# Patient Record
Sex: FEMALE | Race: WHITE | NOT HISPANIC OR LATINO
[De-identification: names, ages, dates, MRNs, and addresses within clinical notes are randomized per-mention and may not be internally consistent; named-entity substitution may affect disease eponyms.]

---

## 2019-01-29 ENCOUNTER — FORM ENCOUNTER (OUTPATIENT)
Age: 59
End: 2019-01-29

## 2019-05-12 ENCOUNTER — FORM ENCOUNTER (OUTPATIENT)
Age: 59
End: 2019-05-12

## 2019-07-02 ENCOUNTER — APPOINTMENT (OUTPATIENT)
Dept: CARDIOLOGY | Facility: CLINIC | Age: 59
End: 2019-07-02
Payer: MEDICARE

## 2019-07-02 PROCEDURE — 93000 ELECTROCARDIOGRAM COMPLETE: CPT

## 2019-07-02 PROCEDURE — 99204 OFFICE O/P NEW MOD 45 MIN: CPT

## 2019-07-28 ENCOUNTER — FORM ENCOUNTER (OUTPATIENT)
Age: 59
End: 2019-07-28

## 2019-07-31 ENCOUNTER — APPOINTMENT (OUTPATIENT)
Dept: CARDIOLOGY | Facility: CLINIC | Age: 59
End: 2019-07-31

## 2019-08-01 ENCOUNTER — APPOINTMENT (OUTPATIENT)
Dept: CARDIOLOGY | Facility: CLINIC | Age: 59
End: 2019-08-01
Payer: MEDICARE

## 2019-08-01 PROCEDURE — 93015 CV STRESS TEST SUPVJ I&R: CPT

## 2019-09-23 ENCOUNTER — APPOINTMENT (OUTPATIENT)
Dept: CARDIOLOGY | Facility: CLINIC | Age: 59
End: 2019-09-23
Payer: MEDICARE

## 2019-09-23 PROCEDURE — 93306 TTE W/DOPPLER COMPLETE: CPT

## 2019-10-22 ENCOUNTER — OUTPATIENT (OUTPATIENT)
Dept: OUTPATIENT SERVICES | Facility: HOSPITAL | Age: 59
LOS: 1 days | Discharge: HOME | End: 2019-10-22

## 2019-10-22 DIAGNOSIS — I42.9 CARDIOMYOPATHY, UNSPECIFIED: ICD-10-CM

## 2019-10-22 DIAGNOSIS — E05.00 THYROTOXICOSIS WITH DIFFUSE GOITER WITHOUT THYROTOXIC CRISIS OR STORM: ICD-10-CM

## 2019-10-22 DIAGNOSIS — R53.83 OTHER FATIGUE: ICD-10-CM

## 2019-10-22 DIAGNOSIS — E78.5 HYPERLIPIDEMIA, UNSPECIFIED: ICD-10-CM

## 2019-10-22 DIAGNOSIS — R94.6 ABNORMAL RESULTS OF THYROID FUNCTION STUDIES: ICD-10-CM

## 2019-10-22 DIAGNOSIS — E55.9 VITAMIN D DEFICIENCY, UNSPECIFIED: ICD-10-CM

## 2020-01-21 ENCOUNTER — FORM ENCOUNTER (OUTPATIENT)
Age: 60
End: 2020-01-21

## 2020-06-14 ENCOUNTER — FORM ENCOUNTER (OUTPATIENT)
Age: 60
End: 2020-06-14

## 2020-06-15 ENCOUNTER — EMERGENCY (EMERGENCY)
Facility: HOSPITAL | Age: 60
LOS: 0 days | Discharge: HOME | End: 2020-06-15
Attending: EMERGENCY MEDICINE | Admitting: EMERGENCY MEDICINE
Payer: MEDICARE

## 2020-06-15 VITALS
TEMPERATURE: 99 F | DIASTOLIC BLOOD PRESSURE: 66 MMHG | RESPIRATION RATE: 18 BRPM | SYSTOLIC BLOOD PRESSURE: 122 MMHG | OXYGEN SATURATION: 100 % | HEART RATE: 80 BPM

## 2020-06-15 VITALS
TEMPERATURE: 99 F | HEART RATE: 88 BPM | SYSTOLIC BLOOD PRESSURE: 151 MMHG | RESPIRATION RATE: 18 BRPM | DIASTOLIC BLOOD PRESSURE: 86 MMHG | OXYGEN SATURATION: 97 % | WEIGHT: 199.08 LBS | HEIGHT: 65 IN

## 2020-06-15 DIAGNOSIS — I10 ESSENTIAL (PRIMARY) HYPERTENSION: ICD-10-CM

## 2020-06-15 DIAGNOSIS — Z79.899 OTHER LONG TERM (CURRENT) DRUG THERAPY: ICD-10-CM

## 2020-06-15 DIAGNOSIS — R07.89 OTHER CHEST PAIN: ICD-10-CM

## 2020-06-15 DIAGNOSIS — E11.9 TYPE 2 DIABETES MELLITUS WITHOUT COMPLICATIONS: ICD-10-CM

## 2020-06-15 DIAGNOSIS — Z79.84 LONG TERM (CURRENT) USE OF ORAL HYPOGLYCEMIC DRUGS: ICD-10-CM

## 2020-06-15 LAB
ALBUMIN SERPL ELPH-MCNC: 4.6 G/DL — SIGNIFICANT CHANGE UP (ref 3.5–5.2)
ALP SERPL-CCNC: 76 U/L — SIGNIFICANT CHANGE UP (ref 30–115)
ALT FLD-CCNC: 27 U/L — SIGNIFICANT CHANGE UP (ref 0–41)
ANION GAP SERPL CALC-SCNC: 12 MMOL/L — SIGNIFICANT CHANGE UP (ref 7–14)
APTT BLD: 31.3 SEC — SIGNIFICANT CHANGE UP (ref 27–39.2)
AST SERPL-CCNC: 17 U/L — SIGNIFICANT CHANGE UP (ref 0–41)
BASOPHILS # BLD AUTO: 0.08 K/UL — SIGNIFICANT CHANGE UP (ref 0–0.2)
BASOPHILS NFR BLD AUTO: 0.9 % — SIGNIFICANT CHANGE UP (ref 0–1)
BILIRUB SERPL-MCNC: 0.4 MG/DL — SIGNIFICANT CHANGE UP (ref 0.2–1.2)
BUN SERPL-MCNC: 14 MG/DL — SIGNIFICANT CHANGE UP (ref 10–20)
CALCIUM SERPL-MCNC: 9.7 MG/DL — SIGNIFICANT CHANGE UP (ref 8.5–10.1)
CHLORIDE SERPL-SCNC: 103 MMOL/L — SIGNIFICANT CHANGE UP (ref 98–110)
CO2 SERPL-SCNC: 24 MMOL/L — SIGNIFICANT CHANGE UP (ref 17–32)
CREAT SERPL-MCNC: 0.8 MG/DL — SIGNIFICANT CHANGE UP (ref 0.7–1.5)
EOSINOPHIL # BLD AUTO: 0.32 K/UL — SIGNIFICANT CHANGE UP (ref 0–0.7)
EOSINOPHIL NFR BLD AUTO: 3.7 % — SIGNIFICANT CHANGE UP (ref 0–8)
GLUCOSE SERPL-MCNC: 149 MG/DL — HIGH (ref 70–99)
HCT VFR BLD CALC: 37.8 % — SIGNIFICANT CHANGE UP (ref 37–47)
HGB BLD-MCNC: 12.7 G/DL — SIGNIFICANT CHANGE UP (ref 12–16)
IMM GRANULOCYTES NFR BLD AUTO: 0.5 % — HIGH (ref 0.1–0.3)
INR BLD: 0.96 RATIO — SIGNIFICANT CHANGE UP (ref 0.65–1.3)
LYMPHOCYTES # BLD AUTO: 2.77 K/UL — SIGNIFICANT CHANGE UP (ref 1.2–3.4)
LYMPHOCYTES # BLD AUTO: 32.1 % — SIGNIFICANT CHANGE UP (ref 20.5–51.1)
MCHC RBC-ENTMCNC: 30.8 PG — SIGNIFICANT CHANGE UP (ref 27–31)
MCHC RBC-ENTMCNC: 33.6 G/DL — SIGNIFICANT CHANGE UP (ref 32–37)
MCV RBC AUTO: 91.5 FL — SIGNIFICANT CHANGE UP (ref 81–99)
MONOCYTES # BLD AUTO: 0.77 K/UL — HIGH (ref 0.1–0.6)
MONOCYTES NFR BLD AUTO: 8.9 % — SIGNIFICANT CHANGE UP (ref 1.7–9.3)
NEUTROPHILS # BLD AUTO: 4.65 K/UL — SIGNIFICANT CHANGE UP (ref 1.4–6.5)
NEUTROPHILS NFR BLD AUTO: 53.9 % — SIGNIFICANT CHANGE UP (ref 42.2–75.2)
NRBC # BLD: 0 /100 WBCS — SIGNIFICANT CHANGE UP (ref 0–0)
PLATELET # BLD AUTO: 326 K/UL — SIGNIFICANT CHANGE UP (ref 130–400)
POTASSIUM SERPL-MCNC: 3.9 MMOL/L — SIGNIFICANT CHANGE UP (ref 3.5–5)
POTASSIUM SERPL-SCNC: 3.9 MMOL/L — SIGNIFICANT CHANGE UP (ref 3.5–5)
PROT SERPL-MCNC: 7.4 G/DL — SIGNIFICANT CHANGE UP (ref 6–8)
PROTHROM AB SERPL-ACNC: 11 SEC — SIGNIFICANT CHANGE UP (ref 9.95–12.87)
RBC # BLD: 4.13 M/UL — LOW (ref 4.2–5.4)
RBC # FLD: 12.1 % — SIGNIFICANT CHANGE UP (ref 11.5–14.5)
SODIUM SERPL-SCNC: 139 MMOL/L — SIGNIFICANT CHANGE UP (ref 135–146)
TROPONIN T SERPL-MCNC: <0.01 NG/ML — SIGNIFICANT CHANGE UP
WBC # BLD: 8.63 K/UL — SIGNIFICANT CHANGE UP (ref 4.8–10.8)
WBC # FLD AUTO: 8.63 K/UL — SIGNIFICANT CHANGE UP (ref 4.8–10.8)

## 2020-06-15 PROCEDURE — 99285 EMERGENCY DEPT VISIT HI MDM: CPT

## 2020-06-15 PROCEDURE — 71045 X-RAY EXAM CHEST 1 VIEW: CPT | Mod: 26

## 2020-06-15 NOTE — ED PROVIDER NOTE - CARE PROVIDER_API CALL
Quentin Moore  Interventional Cardiology  [address] [place] [id number]  Phone: [phone]  Fax: [phone]  Follow Up Time:

## 2020-06-15 NOTE — ED PROVIDER NOTE - ATTENDING CONTRIBUTION TO CARE
59 y.o. female comes in for evaluation. Pt states has been having some chest discomfort over last 6 days, today went for a routine GYN appointment and was found to have high BP. Pt denies any symptoms now. No HA, CP/SOB, abdominal pain, n/v/c/d. No vision changes. On exam, pt in NAD, AAOx3, head NC/AT, CN II-XII intact, lungs CTA B/L, CV S1S2 regular, abdomen soft/NT/ND/(+)BS, ext (-) edema, motor 5/5x4, sensation intact. Will do labs, EKG, CXR and reevaluate.

## 2020-06-15 NOTE — ED ADULT TRIAGE NOTE - CHIEF COMPLAINT QUOTE
pt went to her GYN today and had elevated bp 180/100, pt also states that yesterday she woke up feeling dizzy and had chest tightness

## 2020-06-15 NOTE — ED PROVIDER NOTE - PATIENT PORTAL LINK FT
You can access the FollowMyHealth Patient Portal offered by [facility] by registering at the following website: [email or web address]. By joining [employer]’s FollowMyHealth portal, you will also be able to view your health information using other applications (apps) compatible with our system.

## 2020-06-15 NOTE — ED PROVIDER NOTE - NS ED ROS FT
Review of Systems:  	•	CONSTITUTIONAL - no fever, no diaphoresis, no chills  	•	SKIN - no rash  	•	HEMATOLOGIC - no bleeding, no bruising  	•	EYES - no eye pain, no blurry vision  	•	ENT - no change in hearing, no sore throat, no ear pain or tinnitus  	•	RESPIRATORY - no shortness of breath, no cough  	•	CARDIAC -  chest pain, no palpitations  	•	GI - no abd pain, no nausea, no vomiting, no diarrhea, no constipation  	•	GENITO-URINARY - no discharge, no dysuria; no hematuria, no increased urinary frequency  	•	MUSCULOSKELETAL - no joint paint, no swelling, no redness  	•	NEUROLOGIC - no weakness, no headache, no paresthesias, no LOC  	•	PSYCH - no anxiety, non suicidal, non homicidal, no hallucination, no depression Review of Systems:  	•	CONSTITUTIONAL - no fever, no diaphoresis, no chills  	•	SKIN - no rash  	•	HEMATOLOGIC - no bleeding, no bruising  	•	EYES - no eye pain, no blurry vision  	•	ENT - no change in hearing, no sore throat, no ear pain or tinnitus  	•	RESPIRATORY - no shortness of breath, no cough  	•	CARDIAC -  chest pain intermittent none today , no palpitations  	•	GI - no abd pain, no nausea, no vomiting, no diarrhea, no constipation  	•	GENITO-URINARY - no discharge, no dysuria; no hematuria, no increased urinary frequency  	•	MUSCULOSKELETAL - no joint paint, no swelling, no redness  	•	NEUROLOGIC - no weakness, no headache, no paresthesias, no LOC  	•	PSYCH - no anxiety, non suicidal, non homicidal, no hallucination, no depression

## 2020-06-15 NOTE — ED ADULT NURSE NOTE - OBJECTIVE STATEMENT
hypertensive at doctor's office today, no history, felt chest pressure for the last few days, aaox3, skin warm and dry, steady gait to restroom

## 2020-06-17 ENCOUNTER — APPOINTMENT (OUTPATIENT)
Dept: CARDIOLOGY | Facility: CLINIC | Age: 60
End: 2020-06-17
Payer: MEDICARE

## 2020-06-17 PROBLEM — E11.9 TYPE 2 DIABETES MELLITUS WITHOUT COMPLICATIONS: Chronic | Status: ACTIVE | Noted: 2020-06-15

## 2020-06-17 PROBLEM — I10 ESSENTIAL (PRIMARY) HYPERTENSION: Chronic | Status: ACTIVE | Noted: 2020-06-15

## 2020-06-17 PROCEDURE — 93000 ELECTROCARDIOGRAM COMPLETE: CPT

## 2020-06-17 PROCEDURE — 99214 OFFICE O/P EST MOD 30 MIN: CPT

## 2020-06-18 ENCOUNTER — FORM ENCOUNTER (OUTPATIENT)
Age: 60
End: 2020-06-18

## 2020-06-21 ENCOUNTER — FORM ENCOUNTER (OUTPATIENT)
Age: 60
End: 2020-06-21

## 2020-06-22 ENCOUNTER — APPOINTMENT (OUTPATIENT)
Dept: CARDIOLOGY | Facility: CLINIC | Age: 60
End: 2020-06-22
Payer: MEDICARE

## 2020-06-22 PROCEDURE — 93015 CV STRESS TEST SUPVJ I&R: CPT

## 2020-07-06 ENCOUNTER — FORM ENCOUNTER (OUTPATIENT)
Age: 60
End: 2020-07-06

## 2020-07-13 ENCOUNTER — FORM ENCOUNTER (OUTPATIENT)
Age: 60
End: 2020-07-13

## 2020-07-20 ENCOUNTER — APPOINTMENT (OUTPATIENT)
Dept: CARDIOLOGY | Facility: CLINIC | Age: 60
End: 2020-07-20
Payer: MEDICARE

## 2020-07-20 PROCEDURE — 93000 ELECTROCARDIOGRAM COMPLETE: CPT

## 2020-07-20 PROCEDURE — 99214 OFFICE O/P EST MOD 30 MIN: CPT

## 2020-07-23 ENCOUNTER — FORM ENCOUNTER (OUTPATIENT)
Age: 60
End: 2020-07-23

## 2020-08-23 ENCOUNTER — FORM ENCOUNTER (OUTPATIENT)
Age: 60
End: 2020-08-23

## 2021-01-07 ENCOUNTER — INPATIENT (INPATIENT)
Facility: HOSPITAL | Age: 61
LOS: 1 days | Discharge: HOME HEALTH | End: 2021-01-09
Attending: INTERNAL MEDICINE | Admitting: INTERNAL MEDICINE
Payer: MEDICARE

## 2021-01-07 ENCOUNTER — TRANSCRIPTION ENCOUNTER (OUTPATIENT)
Age: 61
End: 2021-01-07

## 2021-01-07 VITALS
TEMPERATURE: 100 F | WEIGHT: 190.04 LBS | SYSTOLIC BLOOD PRESSURE: 150 MMHG | DIASTOLIC BLOOD PRESSURE: 92 MMHG | RESPIRATION RATE: 18 BRPM | OXYGEN SATURATION: 92 % | HEART RATE: 112 BPM | HEIGHT: 65 IN

## 2021-01-07 DIAGNOSIS — Z98.890 OTHER SPECIFIED POSTPROCEDURAL STATES: Chronic | ICD-10-CM

## 2021-01-07 LAB
ALBUMIN SERPL ELPH-MCNC: 3.7 G/DL — SIGNIFICANT CHANGE UP (ref 3.5–5.2)
ALP SERPL-CCNC: 61 U/L — SIGNIFICANT CHANGE UP (ref 30–115)
ALT FLD-CCNC: 45 U/L — HIGH (ref 0–41)
ANION GAP SERPL CALC-SCNC: 13 MMOL/L — SIGNIFICANT CHANGE UP (ref 7–14)
AST SERPL-CCNC: 38 U/L — SIGNIFICANT CHANGE UP (ref 0–41)
BASOPHILS # BLD AUTO: 0.01 K/UL — SIGNIFICANT CHANGE UP (ref 0–0.2)
BASOPHILS NFR BLD AUTO: 0.1 % — SIGNIFICANT CHANGE UP (ref 0–1)
BILIRUB SERPL-MCNC: 0.8 MG/DL — SIGNIFICANT CHANGE UP (ref 0.2–1.2)
BUN SERPL-MCNC: 11 MG/DL — SIGNIFICANT CHANGE UP (ref 10–20)
CALCIUM SERPL-MCNC: 8.6 MG/DL — SIGNIFICANT CHANGE UP (ref 8.5–10.1)
CHLORIDE SERPL-SCNC: 93 MMOL/L — LOW (ref 98–110)
CO2 SERPL-SCNC: 24 MMOL/L — SIGNIFICANT CHANGE UP (ref 17–32)
CREAT SERPL-MCNC: 0.7 MG/DL — SIGNIFICANT CHANGE UP (ref 0.7–1.5)
D DIMER BLD IA.RAPID-MCNC: 650 NG/ML DDU — HIGH (ref 0–230)
EOSINOPHIL # BLD AUTO: 0 K/UL — SIGNIFICANT CHANGE UP (ref 0–0.7)
EOSINOPHIL NFR BLD AUTO: 0 % — SIGNIFICANT CHANGE UP (ref 0–8)
GLUCOSE BLDC GLUCOMTR-MCNC: 160 MG/DL — HIGH (ref 70–99)
GLUCOSE SERPL-MCNC: 257 MG/DL — HIGH (ref 70–99)
HCT VFR BLD CALC: 33.4 % — LOW (ref 37–47)
HGB BLD-MCNC: 11.3 G/DL — LOW (ref 12–16)
IMM GRANULOCYTES NFR BLD AUTO: 0.8 % — HIGH (ref 0.1–0.3)
LYMPHOCYTES # BLD AUTO: 0.61 K/UL — LOW (ref 1.2–3.4)
LYMPHOCYTES # BLD AUTO: 7.7 % — LOW (ref 20.5–51.1)
MCHC RBC-ENTMCNC: 31.1 PG — HIGH (ref 27–31)
MCHC RBC-ENTMCNC: 33.8 G/DL — SIGNIFICANT CHANGE UP (ref 32–37)
MCV RBC AUTO: 92 FL — SIGNIFICANT CHANGE UP (ref 81–99)
MONOCYTES # BLD AUTO: 0.61 K/UL — HIGH (ref 0.1–0.6)
MONOCYTES NFR BLD AUTO: 7.7 % — SIGNIFICANT CHANGE UP (ref 1.7–9.3)
NEUTROPHILS # BLD AUTO: 6.67 K/UL — HIGH (ref 1.4–6.5)
NEUTROPHILS NFR BLD AUTO: 83.7 % — HIGH (ref 42.2–75.2)
NRBC # BLD: 0 /100 WBCS — SIGNIFICANT CHANGE UP (ref 0–0)
NT-PROBNP SERPL-SCNC: 174 PG/ML — SIGNIFICANT CHANGE UP (ref 0–300)
PLATELET # BLD AUTO: 400 K/UL — SIGNIFICANT CHANGE UP (ref 130–400)
POTASSIUM SERPL-MCNC: 3.8 MMOL/L — SIGNIFICANT CHANGE UP (ref 3.5–5)
POTASSIUM SERPL-SCNC: 3.8 MMOL/L — SIGNIFICANT CHANGE UP (ref 3.5–5)
PROT SERPL-MCNC: 7 G/DL — SIGNIFICANT CHANGE UP (ref 6–8)
RAPID RVP RESULT: SIGNIFICANT CHANGE UP
RBC # BLD: 3.63 M/UL — LOW (ref 4.2–5.4)
RBC # FLD: 11.9 % — SIGNIFICANT CHANGE UP (ref 11.5–14.5)
SARS-COV-2 RNA SPEC QL NAA+PROBE: SIGNIFICANT CHANGE UP
SODIUM SERPL-SCNC: 130 MMOL/L — LOW (ref 135–146)
TROPONIN T SERPL-MCNC: <0.01 NG/ML — SIGNIFICANT CHANGE UP
WBC # BLD: 7.96 K/UL — SIGNIFICANT CHANGE UP (ref 4.8–10.8)
WBC # FLD AUTO: 7.96 K/UL — SIGNIFICANT CHANGE UP (ref 4.8–10.8)

## 2021-01-07 PROCEDURE — 99285 EMERGENCY DEPT VISIT HI MDM: CPT | Mod: CS

## 2021-01-07 PROCEDURE — 93970 EXTREMITY STUDY: CPT | Mod: 26

## 2021-01-07 PROCEDURE — 71045 X-RAY EXAM CHEST 1 VIEW: CPT | Mod: 26

## 2021-01-07 PROCEDURE — 99223 1ST HOSP IP/OBS HIGH 75: CPT | Mod: CS

## 2021-01-07 PROCEDURE — 93010 ELECTROCARDIOGRAM REPORT: CPT

## 2021-01-07 RX ORDER — PANTOPRAZOLE SODIUM 20 MG/1
40 TABLET, DELAYED RELEASE ORAL
Refills: 0 | Status: DISCONTINUED | OUTPATIENT
Start: 2021-01-07 | End: 2021-01-09

## 2021-01-07 RX ORDER — SODIUM CHLORIDE 9 MG/ML
2000 INJECTION, SOLUTION INTRAVENOUS ONCE
Refills: 0 | Status: COMPLETED | OUTPATIENT
Start: 2021-01-07 | End: 2021-01-07

## 2021-01-07 RX ORDER — INSULIN GLARGINE 100 [IU]/ML
12 INJECTION, SOLUTION SUBCUTANEOUS AT BEDTIME
Refills: 0 | Status: DISCONTINUED | OUTPATIENT
Start: 2021-01-07 | End: 2021-01-09

## 2021-01-07 RX ORDER — LOSARTAN POTASSIUM 100 MG/1
50 TABLET, FILM COATED ORAL DAILY
Refills: 0 | Status: DISCONTINUED | OUTPATIENT
Start: 2021-01-07 | End: 2021-01-09

## 2021-01-07 RX ORDER — ALBUTEROL 90 UG/1
1 AEROSOL, METERED ORAL EVERY 6 HOURS
Refills: 0 | Status: DISCONTINUED | OUTPATIENT
Start: 2021-01-07 | End: 2021-01-09

## 2021-01-07 RX ORDER — ENOXAPARIN SODIUM 100 MG/ML
40 INJECTION SUBCUTANEOUS
Refills: 0 | Status: DISCONTINUED | OUTPATIENT
Start: 2021-01-07 | End: 2021-01-09

## 2021-01-07 RX ORDER — INSULIN LISPRO 100/ML
VIAL (ML) SUBCUTANEOUS
Refills: 0 | Status: DISCONTINUED | OUTPATIENT
Start: 2021-01-07 | End: 2021-01-08

## 2021-01-07 RX ORDER — SERTRALINE 25 MG/1
1 TABLET, FILM COATED ORAL
Qty: 0 | Refills: 0 | DISCHARGE

## 2021-01-07 RX ORDER — GLUCAGON INJECTION, SOLUTION 0.5 MG/.1ML
1 INJECTION, SOLUTION SUBCUTANEOUS ONCE
Refills: 0 | Status: DISCONTINUED | OUTPATIENT
Start: 2021-01-07 | End: 2021-01-08

## 2021-01-07 RX ORDER — DEXTROSE 50 % IN WATER 50 %
15 SYRINGE (ML) INTRAVENOUS ONCE
Refills: 0 | Status: DISCONTINUED | OUTPATIENT
Start: 2021-01-07 | End: 2021-01-08

## 2021-01-07 RX ORDER — ALBUTEROL 90 UG/1
2 AEROSOL, METERED ORAL ONCE
Refills: 0 | Status: COMPLETED | OUTPATIENT
Start: 2021-01-07 | End: 2021-01-07

## 2021-01-07 RX ORDER — TIMOLOL 0.5 %
1 DROPS OPHTHALMIC (EYE)
Refills: 0 | Status: DISCONTINUED | OUTPATIENT
Start: 2021-01-07 | End: 2021-01-09

## 2021-01-07 RX ORDER — SODIUM CHLORIDE 9 MG/ML
1000 INJECTION INTRAMUSCULAR; INTRAVENOUS; SUBCUTANEOUS
Refills: 0 | Status: DISCONTINUED | OUTPATIENT
Start: 2021-01-07 | End: 2021-01-09

## 2021-01-07 RX ORDER — DEXTROSE 50 % IN WATER 50 %
25 SYRINGE (ML) INTRAVENOUS ONCE
Refills: 0 | Status: DISCONTINUED | OUTPATIENT
Start: 2021-01-07 | End: 2021-01-08

## 2021-01-07 RX ORDER — DEXAMETHASONE 0.5 MG/5ML
6 ELIXIR ORAL DAILY
Refills: 0 | Status: DISCONTINUED | OUTPATIENT
Start: 2021-01-07 | End: 2021-01-08

## 2021-01-07 RX ORDER — SODIUM CHLORIDE 9 MG/ML
1000 INJECTION, SOLUTION INTRAVENOUS
Refills: 0 | Status: DISCONTINUED | OUTPATIENT
Start: 2021-01-07 | End: 2021-01-08

## 2021-01-07 RX ORDER — LOSARTAN POTASSIUM 100 MG/1
1 TABLET, FILM COATED ORAL
Qty: 0 | Refills: 0 | DISCHARGE

## 2021-01-07 RX ORDER — METFORMIN HYDROCHLORIDE 850 MG/1
1 TABLET ORAL
Qty: 0 | Refills: 0 | DISCHARGE

## 2021-01-07 RX ORDER — CHLORHEXIDINE GLUCONATE 213 G/1000ML
1 SOLUTION TOPICAL
Refills: 0 | Status: DISCONTINUED | OUTPATIENT
Start: 2021-01-07 | End: 2021-01-09

## 2021-01-07 RX ORDER — DEXTROSE 50 % IN WATER 50 %
12.5 SYRINGE (ML) INTRAVENOUS ONCE
Refills: 0 | Status: DISCONTINUED | OUTPATIENT
Start: 2021-01-07 | End: 2021-01-08

## 2021-01-07 RX ADMIN — INSULIN GLARGINE 12 UNIT(S): 100 INJECTION, SOLUTION SUBCUTANEOUS at 21:50

## 2021-01-07 RX ADMIN — ALBUTEROL 1 PUFF(S): 90 AEROSOL, METERED ORAL at 21:50

## 2021-01-07 RX ADMIN — SODIUM CHLORIDE 2000 MILLILITER(S): 9 INJECTION, SOLUTION INTRAVENOUS at 13:05

## 2021-01-07 RX ADMIN — ALBUTEROL 2 PUFF(S): 90 AEROSOL, METERED ORAL at 14:09

## 2021-01-07 NOTE — H&P ADULT - NSHPLABSRESULTS_GEN_ALL_CORE
LABS:                        11.3   7.96  )-----------( 400      ( 07 Jan 2021 14:10 )             33.4     01-07    130<L>  |  93<L>  |  11  ----------------------------<  257<H>  3.8   |  24  |  0.7    Ca    8.6      07 Jan 2021 14:10    TPro  7.0  /  Alb  3.7  /  TBili  0.8  /  DBili  x   /  AST  38  /  ALT  45<H>  /  AlkPhos  61  01-07        CAPILLARY BLOOD GLUCOSE          RADIOLOGY & ADDITIONAL TESTS: Reviewed.      CXR: New bibasilar predominant opacities. No evidence of pneumothorax.

## 2021-01-07 NOTE — ED PROVIDER NOTE - NS ED ROS FT
Constitutional:  see HPI  Head:  no headache, dizziness, loss of consciousness  Eyes:  no visual changes; no eye pain, redness, or discharge  ENMT:  no ear pain or discharge; no hearing problems; no mouth or throat sores or lesions; no throat pain  Cardiac: tachy  Respiratory: cough, sob  GI: diarrhea  :  no dysuria, frequency, or burning with urination; no change in urine output  MS: no myalgias, muscle weakness, joint pain,or  injury; no joint swelling  Neuro: no weakness; no numbness or tingling; no seizure  Skin:  no rashes or color changes; no lacerations or abrasions

## 2021-01-07 NOTE — H&P ADULT - ATTENDING COMMENTS
I saw and evaluated the patient on 01/07/2021 (date of service). I have reviewed and agree with the findings and plan of care as documented above in the resident’s note (unless indicated differently below). Any necessary changes were made in the body of the text.    CC: SOB    HPI:  61 yo F pt who tested positive for SARS-CoV-2 on 12/27/2020 now coming in with SOB (worsening x 3 days) associated w/ intermittent fevers x 12 days (up to 103 deg F), dry coughing, myalgias, diarrhea, anosmia and fatigue/weakness. Precipitating factors: infection as aforementioned (sick contact - family members). Intensity of symptoms: moderate to severe. Pain: generalized aches. Course: waxing and waning. Alleviating factors: none. Aggravating factors: exertion.     ROS:  Constitutional: +fevers; +chills; +generalized weakness  Eyes: no conjunctivitis; no itching  ENT: no dysphagia; no odynophagiap; +anosmia; +ageusia  CVS: no orthopnea; no chest pain  Resp: +SOB (worsening); +coughing (dry)  GI: +nausea (intermittent); no vomiting; +diarrhea (3-4 episodes per day - soft and loose at times); no abd pain; +anorexia  : no dysuria; no hematuria  MSK: +myalgias  Skin: no rashes; no ulcers  Neuro: no focal weakness; +headache  All other systems reviewed and are negative    PMHx, home medications, SurHx, FHx and Social history as above in the corresponding sections of the note - reviewed and edited where appropriate    Exam:  Vitals: BP = 113/74; P = 100; T = 96.7; RR = 18; SpO2 95 on 1.5 L/min via NC  General: appears stated age; cooperative  Eyes: anicteric sclera; moist conjunctiva; PERRL; EOMI  HENT: NC/AT; clear oropharynx w/ moist mucous membranes; nL hard/soft palate  Neck: supple w/ FROM; trachea midline  Lungs: no tachypnea, accessory muscle use, wheezing or rhonci; +rales  CVS: regular rhythm; tachycardic; S1 and S2 w/o MRGs  Abd: BS+; soft; non-tender to palpation x 4; no masses or HSM  Ext: no peripheral edema; pulses palpable distally  Skin: normal temp, turgor and texture; no rashes, ulcers or nodules  Neuro: CN II-XII intact; able to move all extremities  Psych: appropriate affect; alert and oriented to person, place, time and situation    Labs reviewed: H&H 11.3/33.4, d-dimer 650, Na 130, , ALT 45  SARS-CoV-2 PCR negative (but tested positive on 12/27/2020 and has symptoms & consistent findings)  Imaging reviewed: bibasilar predominant opacities on CXR  EKG reviewed: sinus tachycardic; non-specific ST-T changes    Assessment:  (1) Hypoxia 2/2 SARS-CoV-2 PNA  (2) DM w/ hyperglycemia  (3) Hyponatremia with dehydration  (4) HTN - chronic and stable  (5) Abnormal LFT's 2/2 infection    Plan:  (1) Airborne/contact precautions  (2) Inflammatory markers: trend ferritin, CRP and d-dimer [id number]  (3) Supplemental O2 PRN  (4) Start dexamethasone 6 mg daily  (5) DVT ppx w/ enoxaparin (wt based)  (6) Supportive care: PRN analgesics, antiemetics, antitussives, antipyretics  (7) BG monitoring qAC & qHS w/ basal bolus insulin  (8) Gentle hydration w/ NS  (9) Medications as dosed - reviewed and edited where appropriate  (10) Dispo: can d/c plan in 24-48 hrs if respiratory status remains stable  --- 6 minute walk test prior to d/c to determine if there is need for home O2    Full code I saw and evaluated the patient on 01/07/2021 (date of service). I have reviewed and agree with the findings and plan of care as documented above in the resident’s note (unless indicated differently below). Any necessary changes were made in the body of the text.    CC: SOB    HPI:  59 yo F pt who tested positive for SARS-CoV-2 on 12/27/2020 now coming in with SOB (worsening x 3 days) associated w/ intermittent fevers x 12 days (up to 103 deg F), dry coughing, myalgias, diarrhea, anosmia and fatigue/weakness. Precipitating factors: infection as aforementioned (sick contact - family members). Intensity of symptoms: moderate to severe. Pain: generalized aches. Course: waxing and waning. Alleviating factors: none. Aggravating factors: exertion.     ROS:  Constitutional: +fevers; +chills; +generalized weakness  Eyes: no conjunctivitis; no itching  ENT: no dysphagia; no odynophagiap; +anosmia; +ageusia  CVS: no orthopnea; no chest pain  Resp: +SOB (worsening); +coughing (dry)  GI: +nausea (intermittent); no vomiting; +diarrhea (3-4 episodes per day - soft and loose at times); no abd pain; +anorexia  : no dysuria; no hematuria  MSK: +myalgias  Skin: no rashes; no ulcers  Neuro: no focal weakness; +headache  All other systems reviewed and are negative    PMHx, home medications, SurHx, FHx and Social history as above in the corresponding sections of the note - reviewed and edited where appropriate    Exam:  Vitals: BP = 113/74; P = 100; T = 96.7; RR = 18; SpO2 95 on 1.5 L/min via NC  General: appears stated age; cooperative  Eyes: anicteric sclera; moist conjunctiva; PERRL; EOMI  HENT: NC/AT; clear oropharynx w/ moist mucous membranes; nL hard/soft palate  Neck: supple w/ FROM; trachea midline  Lungs: no tachypnea, accessory muscle use, wheezing or rhonci; +rales  CVS: regular rhythm; tachycardic; S1 and S2 w/o MRGs  Abd: BS+; soft; non-tender to palpation x 4; no masses or HSM  Ext: no peripheral edema; pulses palpable distally  Skin: normal temp, turgor and texture; no rashes, ulcers or nodules  Neuro: CN II-XII intact; able to move all extremities  Psych: appropriate affect; alert and oriented to person, place, time and situation    Labs reviewed: H&H 11.3/33.4, d-dimer 650, Na 130, , ALT 45  SARS-CoV-2 PCR negative (but tested positive on 12/27/2020 and has symptoms & consistent findings)  Imaging reviewed: bibasilar predominant opacities on CXR  EKG reviewed: sinus tachycardic; non-specific ST-T changes    Assessment:  (1) Hypoxia 2/2 SARS-CoV-2 PNA  (2) DM w/ hyperglycemia  (3) Hyponatremia with dehydration  (4) HTN - chronic and stable  (5) Abnormal LFT's 2/2 infection    Plan:  (1) Airborne/contact precautions  (2) Inflammatory markers: trend ferritin, CRP and d-dimer [id number]  (3) Supplemental O2 PRN  (4) Start dexamethasone 6 mg daily  (5) DVT ppx w/ enoxaparin (wt based)  (6) Supportive care: PRN analgesics, antiemetics, antitussives, antipyretics  (7) BG monitoring qAC & qHS w/ basal bolus insulin  (8) Gentle hydration w/ NS  (9) Medications as dosed - reviewed and edited where appropriate  (10) Dispo: can d/c plan in 24 hrs if respiratory status remains stable  --- 6 minute walk test prior to d/c to determine if there is need for home O2    Full code

## 2021-01-07 NOTE — ED PROVIDER NOTE - CLINICAL SUMMARY MEDICAL DECISION MAKING FREE TEXT BOX
60yoF with h/o HTN, DM, +COVID twelve days ago with fever, diarrhea, cough, worsening 4 days ago with worsened cough and onset of SOB. Denies CP, abd pain, vomiting, LE pain or swelling. On exam, afebrile, hemodynamically stable, saturating low 90's on ra, well on NC, NAD, well appearing, no WOB, head NCAT, EOMI grossly, anicteric, MMM, no JVD, RRR, nml S1/S2, no m/r/g, lungs CTAB, no w/r/r, abd soft, NT, ND, nml BS, no rebound or guarding, AAO, CN's 3-12 grossly intact, HOPE spontaneously, no leg cyanosis or edema, skin warm, well perfused, no rashes or hives. Character low suspicion for PE and no evidence of DVT and symptom course is c/w COVID course with no improvement then acute worsening that might be expected with PE. Character low suspicion for ACS and ECG/trop unremarkable. Character and exam low suspicion for CHF or cardiomyopathy. Character and context consistent with and concerning for COVID-19. Character and context low suspicion for PNA. Patient well appearing, hemodynamically stable, no WOB. Given alb inhaler, fluids. Admitted to internal medicine for further monitoring, w/u, and care.

## 2021-01-07 NOTE — H&P ADULT - HISTORY OF PRESENT ILLNESS
60 y.o lady with pmh of type 2 DM, HTN, came in for worsening sob over the last 3 days associated with fever of 12days duration   hx goes back to 12 days prior to presentation when the patient moved to [place] to spend christmas day with her daughter when she started feeling tired, weak and was having headache and low grade fever. 2 days later she starts having dry cough and worsening fever reaching 103,requiring tylenol every 6 hrs. was tested positive for covid on dec 27, 2020  she drived back from [place] to [place] on jan 1 2021 with her  and they did not stopped during their roadtrip   3 days later, she started having worsening shortness of breath and she decided to go to the ER today because she was not able to move around   in the ER , was found to have spo2 91% on RA , NC 3 liters applied and her spo2 went up to 96%, was given albuterol and 2 liters LR   tested negative for covid in ED   CXR: bilateral bibasilar predominant opacities  she will be admitted for further management

## 2021-01-07 NOTE — ED PROVIDER NOTE - CARE PLAN
Principal Discharge DX:	COVID-19   Principal Discharge DX:	COVID-19 virus infection  Secondary Diagnosis:	Hypoxia

## 2021-01-07 NOTE — H&P ADULT - NSHPPHYSICALEXAM_GEN_ALL_CORE
in NAD  no JVD  heart: RRR, tachycardic, no audible murmurs  lungs: bilateral basal crackles  abdomen: soft, non tender, non distended, + BS  no NAZANIN  no calf tenderness

## 2021-01-07 NOTE — H&P ADULT - ASSESSMENT
60 y.o lady with pmh of type 2 DM, HTN, came in for worsening sob over the last 3 days associated with fever and dry cough of 12 days duration   was tested positive for covid on dec 27 when she was in [place], then tested negative on her presentation today to the ER  was found to be hypoxic and tachycardic on presentation, will be admitted for further management    # COVID viral pneumonia:  - complicated by acute hypoxic respiratory failure with spo2 91% on RA / improved to 96% on 2 Liters NC   - keep spo2> 94%   - covid PCR negative today   - start dexamethasone 6 mg iv daily for 10 days   - CS ID  - start remdesivir 200 mg once then 100 mg once a day for 4 days  - not a candidate for convalescent plasma   - check inflammatory markers   - order VA duplex LE in view of hx of long roadtrip and covid infection   - trend d-dimers  - start lovenox 40 mg sc BID     # type 2 DM:  - basal / bolus insulin   - keep FS < 180  - check hba1c    # HTN:  - keep on losartan     # hyponatremia:  - most likely hypovolemic hyponatremia  - will start ns 50 cc / h    # DVT prophylaxis: lovenox 40 mg sc bid  # ppi prophylaxis: pantoprazole   # full code  # diet: carb consistent   # ambulate as tolerated   # admit for further management  60 y.o lady with pmh of type 2 DM, HTN, came in for worsening sob over the last 3 days associated with fever and dry cough of 12 days duration   was tested positive for covid on dec 27 when she was in [place], then tested negative on her presentation today to the ER  was found to be hypoxic and tachycardic on presentation, will be admitted for further management    # COVID viral pneumonia:  - complicated by acute hypoxic respiratory failure with spo2 91% on RA / improved to 96% on 2 Liters NC   - keep spo2> 94%   - covid PCR negative today   - start dexamethasone 6 mg iv daily for 10 days   - CS ID  - not a candidate for convalescent plasma   - check inflammatory markers   - order VA duplex LE  - CT angio PE protocol in view of recent roadtrip and COVID infection   - trend d-dimers  - start lovenox 40 mg sc BID     # type 2 DM:  - basal / bolus insulin   - keep FS < 180  - check hba1c    # HTN:  - keep on losartan     # hyponatremia:  - most likely hypovolemic hyponatremia  - will start ns 50 cc / h    # DVT prophylaxis: lovenox 40 mg sc bid  # ppi prophylaxis: pantoprazole   # full code  # diet: carb consistent   # ambulate as tolerated   # admit for further management

## 2021-01-07 NOTE — ED PROVIDER NOTE - PHYSICAL EXAMINATION
CONSTITUTIONAL: Well-developed; well-nourished; in no acute distress.   SKIN: warm, dry  HEAD: Normocephalic; atraumatic.  EYES: PERRL, EOMI, normal sclera and conjunctiva   ENT: No nasal discharge; airway clear.  NECK: Supple; non tender.  CARD: S1, S2 normal; no murmurs, gallops, or rubs. tachy  RESP: tachypneic, b/l rhonchi  ABD: soft ntnd  EXT: Normal ROM.  No clubbing, cyanosis or edema.   LYMPH: No acute cervical adenopathy.  NEURO: Alert, oriented, grossly unremarkable  PSYCH: Cooperative, appropriate.

## 2021-01-07 NOTE — ED PROVIDER NOTE - OBJECTIVE STATEMENT
61 yo female hx of htn and dm presenting with fever, cough, sob, diarrhea for the past  12 days, tested + 12 days ago, and now 3 days of worsening cough and sob. denies chest pain, abd pain, nausea, vomiting, calf swelling. Pt was on an 18 hour car ride 12 days ago, otherwise no other risk factors of DVT/PE. Sating 91% on RA and improved to 96% on 2L O2.

## 2021-01-08 ENCOUNTER — TRANSCRIPTION ENCOUNTER (OUTPATIENT)
Age: 61
End: 2021-01-08

## 2021-01-08 VITALS
DIASTOLIC BLOOD PRESSURE: 74 MMHG | SYSTOLIC BLOOD PRESSURE: 130 MMHG | OXYGEN SATURATION: 98 % | RESPIRATION RATE: 18 BRPM | TEMPERATURE: 99 F | HEART RATE: 60 BPM

## 2021-01-08 LAB
A1C WITH ESTIMATED AVERAGE GLUCOSE RESULT: 7.8 % — HIGH (ref 4–5.6)
ANION GAP SERPL CALC-SCNC: 12 MMOL/L — SIGNIFICANT CHANGE UP (ref 7–14)
BASOPHILS # BLD AUTO: 0.02 K/UL — SIGNIFICANT CHANGE UP (ref 0–0.2)
BASOPHILS NFR BLD AUTO: 0.3 % — SIGNIFICANT CHANGE UP (ref 0–1)
BILIRUB DIRECT SERPL-MCNC: 0.3 MG/DL — HIGH (ref 0–0.2)
BILIRUB INDIRECT FLD-MCNC: 0.3 MG/DL — SIGNIFICANT CHANGE UP (ref 0.2–1.2)
BILIRUB SERPL-MCNC: 0.6 MG/DL — SIGNIFICANT CHANGE UP (ref 0.2–1.2)
BUN SERPL-MCNC: 11 MG/DL — SIGNIFICANT CHANGE UP (ref 10–20)
CALCIUM SERPL-MCNC: 8.5 MG/DL — SIGNIFICANT CHANGE UP (ref 8.5–10.1)
CHLORIDE SERPL-SCNC: 99 MMOL/L — SIGNIFICANT CHANGE UP (ref 98–110)
CO2 SERPL-SCNC: 25 MMOL/L — SIGNIFICANT CHANGE UP (ref 17–32)
CREAT SERPL-MCNC: 0.6 MG/DL — LOW (ref 0.7–1.5)
CRP SERPL-MCNC: 10.79 MG/DL — HIGH (ref 0–0.4)
EOSINOPHIL # BLD AUTO: 0 K/UL — SIGNIFICANT CHANGE UP (ref 0–0.7)
EOSINOPHIL NFR BLD AUTO: 0 % — SIGNIFICANT CHANGE UP (ref 0–8)
ESTIMATED AVERAGE GLUCOSE: 177 MG/DL — HIGH (ref 68–114)
FERRITIN SERPL-MCNC: 932 NG/ML — HIGH (ref 15–150)
GLUCOSE BLDC GLUCOMTR-MCNC: 234 MG/DL — HIGH (ref 70–99)
GLUCOSE BLDC GLUCOMTR-MCNC: 333 MG/DL — HIGH (ref 70–99)
GLUCOSE BLDC GLUCOMTR-MCNC: 340 MG/DL — HIGH (ref 70–99)
GLUCOSE SERPL-MCNC: 314 MG/DL — HIGH (ref 70–99)
HCT VFR BLD CALC: 30.8 % — LOW (ref 37–47)
HCV AB S/CO SERPL IA: 0.03 COI — SIGNIFICANT CHANGE UP
HCV AB SERPL-IMP: SIGNIFICANT CHANGE UP
HGB BLD-MCNC: 10.8 G/DL — LOW (ref 12–16)
IMM GRANULOCYTES NFR BLD AUTO: 0.8 % — HIGH (ref 0.1–0.3)
LDH SERPL L TO P-CCNC: 336 — HIGH (ref 50–242)
LYMPHOCYTES # BLD AUTO: 0.7 K/UL — LOW (ref 1.2–3.4)
LYMPHOCYTES # BLD AUTO: 11.4 % — LOW (ref 20.5–51.1)
MCHC RBC-ENTMCNC: 31.4 PG — HIGH (ref 27–31)
MCHC RBC-ENTMCNC: 35.1 G/DL — SIGNIFICANT CHANGE UP (ref 32–37)
MCV RBC AUTO: 89.5 FL — SIGNIFICANT CHANGE UP (ref 81–99)
MONOCYTES # BLD AUTO: 0.22 K/UL — SIGNIFICANT CHANGE UP (ref 0.1–0.6)
MONOCYTES NFR BLD AUTO: 3.6 % — SIGNIFICANT CHANGE UP (ref 1.7–9.3)
NEUTROPHILS # BLD AUTO: 5.13 K/UL — SIGNIFICANT CHANGE UP (ref 1.4–6.5)
NEUTROPHILS NFR BLD AUTO: 83.9 % — HIGH (ref 42.2–75.2)
NRBC # BLD: 0 /100 WBCS — SIGNIFICANT CHANGE UP (ref 0–0)
PLATELET # BLD AUTO: 405 K/UL — HIGH (ref 130–400)
POTASSIUM SERPL-MCNC: 4.1 MMOL/L — SIGNIFICANT CHANGE UP (ref 3.5–5)
POTASSIUM SERPL-SCNC: 4.1 MMOL/L — SIGNIFICANT CHANGE UP (ref 3.5–5)
PROCALCITONIN SERPL-MCNC: 0.06 NG/ML — SIGNIFICANT CHANGE UP (ref 0.02–0.1)
RBC # BLD: 3.44 M/UL — LOW (ref 4.2–5.4)
RBC # FLD: 11.9 % — SIGNIFICANT CHANGE UP (ref 11.5–14.5)
SODIUM SERPL-SCNC: 136 MMOL/L — SIGNIFICANT CHANGE UP (ref 135–146)
WBC # BLD: 6.12 K/UL — SIGNIFICANT CHANGE UP (ref 4.8–10.8)
WBC # FLD AUTO: 6.12 K/UL — SIGNIFICANT CHANGE UP (ref 4.8–10.8)

## 2021-01-08 PROCEDURE — 71275 CT ANGIOGRAPHY CHEST: CPT | Mod: 26

## 2021-01-08 PROCEDURE — 99239 HOSP IP/OBS DSCHRG MGMT >30: CPT | Mod: CS

## 2021-01-08 RX ORDER — LOSARTAN POTASSIUM 100 MG/1
1 TABLET, FILM COATED ORAL
Qty: 0 | Refills: 0 | DISCHARGE

## 2021-01-08 RX ORDER — SITAGLIPTIN 50 MG/1
1 TABLET, FILM COATED ORAL
Qty: 0 | Refills: 0 | DISCHARGE

## 2021-01-08 RX ORDER — INSULIN LISPRO 100/ML
VIAL (ML) SUBCUTANEOUS
Refills: 0 | Status: DISCONTINUED | OUTPATIENT
Start: 2021-01-08 | End: 2021-01-09

## 2021-01-08 RX ORDER — PANTOPRAZOLE SODIUM 20 MG/1
1 TABLET, DELAYED RELEASE ORAL
Qty: 0 | Refills: 0 | DISCHARGE

## 2021-01-08 RX ORDER — TIMOLOL 0.5 %
0 DROPS OPHTHALMIC (EYE)
Qty: 0 | Refills: 0 | DISCHARGE

## 2021-01-08 RX ORDER — METFORMIN HYDROCHLORIDE 850 MG/1
1 TABLET ORAL
Qty: 0 | Refills: 0 | DISCHARGE

## 2021-01-08 RX ORDER — DEXAMETHASONE 0.5 MG/5ML
1 ELIXIR ORAL
Qty: 8 | Refills: 0
Start: 2021-01-08 | End: 2021-01-15

## 2021-01-08 RX ORDER — INSULIN LISPRO 100/ML
4 VIAL (ML) SUBCUTANEOUS
Refills: 0 | Status: DISCONTINUED | OUTPATIENT
Start: 2021-01-08 | End: 2021-01-09

## 2021-01-08 RX ORDER — DEXAMETHASONE 0.5 MG/5ML
6 ELIXIR ORAL DAILY
Refills: 0 | Status: DISCONTINUED | OUTPATIENT
Start: 2021-01-09 | End: 2021-01-09

## 2021-01-08 RX ORDER — DEXAMETHASONE 0.5 MG/5ML
6 ELIXIR ORAL ONCE
Refills: 0 | Status: COMPLETED | OUTPATIENT
Start: 2021-01-08 | End: 2021-01-08

## 2021-01-08 RX ADMIN — PANTOPRAZOLE SODIUM 40 MILLIGRAM(S): 20 TABLET, DELAYED RELEASE ORAL at 06:09

## 2021-01-08 RX ADMIN — Medication 4 UNIT(S): at 12:35

## 2021-01-08 RX ADMIN — Medication 4 UNIT(S): at 17:11

## 2021-01-08 RX ADMIN — Medication 4 UNIT(S): at 09:05

## 2021-01-08 RX ADMIN — SODIUM CHLORIDE 50 MILLILITER(S): 9 INJECTION INTRAMUSCULAR; INTRAVENOUS; SUBCUTANEOUS at 06:11

## 2021-01-08 RX ADMIN — Medication 4: at 12:35

## 2021-01-08 RX ADMIN — Medication 1 DROP(S): at 06:10

## 2021-01-08 RX ADMIN — ENOXAPARIN SODIUM 40 MILLIGRAM(S): 100 INJECTION SUBCUTANEOUS at 17:11

## 2021-01-08 RX ADMIN — LOSARTAN POTASSIUM 50 MILLIGRAM(S): 100 TABLET, FILM COATED ORAL at 06:09

## 2021-01-08 RX ADMIN — Medication 6 MILLIGRAM(S): at 00:32

## 2021-01-08 RX ADMIN — ALBUTEROL 1 PUFF(S): 90 AEROSOL, METERED ORAL at 09:05

## 2021-01-08 RX ADMIN — Medication 2: at 17:11

## 2021-01-08 RX ADMIN — Medication 4: at 09:05

## 2021-01-08 RX ADMIN — Medication 1 DROP(S): at 17:11

## 2021-01-08 RX ADMIN — ENOXAPARIN SODIUM 40 MILLIGRAM(S): 100 INJECTION SUBCUTANEOUS at 06:09

## 2021-01-08 RX ADMIN — ALBUTEROL 1 PUFF(S): 90 AEROSOL, METERED ORAL at 12:35

## 2021-01-08 NOTE — CONSULT NOTE ADULT - SUBJECTIVE AND OBJECTIVE BOX
UMERDAHLIA  60y, Female  Allergy: No Known Allergies      CHIEF COMPLAINT:   shortness of breath and fever (07 Jan 2021 18:05)      LOS  1d    HPI  HPI:  60 y.o lady with pmh of type 2 DM, HTN, came in for worsening sob over the last 3 days associated with fever of 12 days duration   hx goes back to 12 days prior to presentation when the patient moved to [place] to spend christmas day with her daughter when she started feeling tired, weak and was having headache and low grade fever. 2 days later she starts having dry cough and worsening fever reaching 103,requiring tylenol every 6 hrs. was tested positive for covid on dec 27, 2020  she drived back from [place] to [place] on jan 1 2021 with her  and they did not stopped during their roadtrip   3 days later, she started having worsening shortness of breath and she decided to go to the ER today because she was not able to move around   in the ER , was found to have spo2 91% on RA , NC 3 liters applied and her spo2 went up to 96%, was given albuterol and 2 liters LR   tested negative for covid in ED   CXR: bilateral bibasilar predominant opacities  she will be admitted for further management  (07 Jan 2021 18:05)      PMH  PAST MEDICAL & SURGICAL HISTORY:  Hypertension    Diabetes mellitus    History of left knee surgery    S/P arthroscopy of left shoulder        FAMILY HISTORY  FHx: diabetes mellitus    No pertinent family history in first degree relatives        SOCIAL HISTORY  Social History:  ex smoker  non alcohol consumer  no illicit drug use (07 Jan 2021 18:05)          VITALS:  T(F): 98.6, Max: 99.5 (01-07-21 @ 11:43)  HR: 86  BP: 123/68  RR: 17Vital Signs Last 24 Hrs  T(C): 37 (08 Jan 2021 05:15), Max: 37.5 (07 Jan 2021 11:43)  T(F): 98.6 (08 Jan 2021 05:15), Max: 99.5 (07 Jan 2021 11:43)  HR: 86 (08 Jan 2021 05:15) (86 - 112)  BP: 123/68 (08 Jan 2021 05:15) (113/74 - 150/92)  BP(mean): 90 (08 Jan 2021 05:15) (90 - 90)  RR: 17 (08 Jan 2021 05:15) (17 - 18)  SpO2: 95% (08 Jan 2021 05:15) (92% - 95%)      TESTS & MEASUREMENTS:                        11.3   7.96  )-----------( 400      ( 07 Jan 2021 14:10 )             33.4     01-07    130<L>  |  93<L>  |  11  ----------------------------<  257<H>  3.8   |  24  |  0.7    Ca    8.6      07 Jan 2021 14:10    TPro  7.0  /  Alb  3.7  /  TBili  0.8  /  DBili  x   /  AST  38  /  ALT  45<H>  /  AlkPhos  61  01-07    eGFR if Non African American: 95 mL/min/1.73M2 (01-07-21 @ 14:10)  eGFR if African American: 110 mL/min/1.73M2 (01-07-21 @ 14:10)    LIVER FUNCTIONS - ( 07 Jan 2021 14:10 )  Alb: 3.7 g/dL / Pro: 7.0 g/dL / ALK PHOS: 61 U/L / ALT: 45 U/L / AST: 38 U/L / GGT: x                     INFECTIOUS DISEASES TESTING  Rapid RVP Result: NotDetec (01-07-21 @ 12:39)      INFLAMMATORY MARKERS      RADIOLOGY & ADDITIONAL TESTS:  I have personally reviewed the last Chest xray  CXR  Xray Chest 1 View- PORTABLE-Urgent:   EXAM:  XR CHEST PORTABLE URGENT 1V            PROCEDURE DATE:  01/07/2021            INTERPRETATION:  Clinical History / Reason for exam: Shortness of Breath, Cough,  Fever    Comparison : Chest radiograph:  6/15/2020    Technique/Positioning: Frontal view of the chest    Findings:    Support devices: None.    Cardiac/mediastinum/hilum: No significant change    Lung parenchyma/Pleura: New bibasilar predominant opacities. No evidence of pneumothorax.    Skeleton/soft tissues: No significant change.    Impression:    New bibasilar predominant opacities. No evidence of pneumothorax.                      LISA ABRAHAM MD; Attending Radiologist  This document has been electronically signed. Jan 7 2021  2:16PM (01-07-21 @ 13:22)      CT  CT Angio Chest PE Protocol w/ IV Cont:   EXAM:  CT ANGIO CHEST PE PROTOCOL IC            PROCEDURE DATE:  01/08/2021            INTERPRETATION:  CLINICAL STATEMENT: Evaluate for pulmonary embolism.    TECHNIQUE: Multislice helical sections were obtained from the thoracic inlet to the lungbases during rapid administration of 65cc intravenous contrast using a CTA protocol. Thin sections were reconstructed through the pulmonary vasculature. Sagittal and coronal reformatted images were acquired, as well as MIP reconstructed images.    COMPARISON CT: None.      FINDINGS:    PULMONARY EMBOLUS: No pulmonary embolus.    LUNGS: Diffuse bilateral groundglass opacities. No pleural effusion or pneumothorax. Bilateral dependent subsegmental atelectasis.    HEART/VESSELS: Heart is within normal limits for size. No pericardial effusion. Normal caliber thoracic aorta.    MEDIASTINUM/THORACIC NODES: Mildly prominent but subcentimeter mediastinal lymph nodes. No definite thoracic adenopathy. Questionable 1 cm hypodense right thyroid gland nodule on image 1.    VISUALIZED UPPER ABDOMEN: Hepatic steatosis.    BONES/SOFT TISSUES: Left breast asymmetric soft tissue density measuring 1.5 x 2.3 cm.  No suspicious osseous lesion.      IMPRESSION:    No pulmonary embolus.    Diffuse bilateral groundglass opacities with areas of linear atelectasis/scarring. Findings can be infectious/inflammatory including atypical/viral pneumonias.    Asymmetric left breast density. Recommend nonemergent correlation with mammography.    Hepatic steatosis.    Questionable 1 cm hypodense right thyroid gland nodule on image 1. Nonemergent thyroid ultrasound can be obtained for further evaluation.            KWADWO ARCHULETA M.D., RESIDENT RADIOLOGIST  This document has been electronically signed.  JOSHUA RYDER MD; Attending Radiologist  This document has been electronically signed. Jan 8 2021  2:08AM (01-08-21 @ 01:02)      CARDIOLOGY TESTING  12 Lead ECG:   Ventricular Rate 108 BPM    Atrial Rate 108 BPM    P-R Interval 128 ms    QRS Duration 74 ms    Q-T Interval 328 ms    QTC Calculation(Bazett) 439 ms    P Axis 42 degrees    R Axis 10 degrees    T Axis 1 degrees    Diagnosis Line Sinus tachycardia  Low voltage QRS  Nonspecific ST abnormality  Abnormal ECG    Confirmed by Matthew Cruz ([id number]) on 1/7/2021 2:01:16 PM (01-07-21 @ 11:51)      MEDICATIONS  ALBUTerol    90 MICROgram(s) HFA Inhaler 1 Inhalation every 6 hours  chlorhexidine 4% Liquid 1 Topical <User Schedule>  dextrose 40% Gel 15 Oral once  dextrose 5%. 1000 IV Continuous <Continuous>  dextrose 5%. 1000 IV Continuous <Continuous>  dextrose 50% Injectable 25 IV Push once  dextrose 50% Injectable 12.5 IV Push once  dextrose 50% Injectable 25 IV Push once  enoxaparin Injectable 40 SubCutaneous two times a day  glucagon  Injectable 1 IntraMuscular once  insulin glargine Injectable (LANTUS) 12 SubCutaneous at bedtime  insulin lispro (ADMELOG) corrective regimen sliding scale  SubCutaneous three times a day before meals  insulin lispro Injectable (ADMELOG) 4 SubCutaneous three times a day before meals  losartan 50 Oral daily  pantoprazole    Tablet 40 Oral before breakfast  sodium chloride 0.9%. 1000 IV Continuous <Continuous>  timolol 0.5% Solution 1 Both EYES two times a day      Weight  Weight (kg): 86.2 (01-07-21 @ 11:43)    ANTIBIOTICS:      ALLERGIES:  No Known Allergies

## 2021-01-08 NOTE — DISCHARGE NOTE PROVIDER - NSDCFUADDINST_GEN_ALL_CORE_FT
The chest CT that was done to rule out a pulmonary embolus noted asymmetric left breast density. It is recommended you follow up with your OBGYN with this result.   The CT also showed a questionable 1 cm hypodense right thyroid gland nodule on image 1. A thyroid ultrasound is recommended outpatient for further evaluation.

## 2021-01-08 NOTE — PROGRESS NOTE ADULT - SUBJECTIVE AND OBJECTIVE BOX
pt seen and examined. pt is sob when she takes the o2 off and ambulates. at rest and on O2 she is comfortable . wants to go home.   Vital Signs Last 24 Hrs  T(C): 36.4 (08 Jan 2021 07:26), Max: 37 (08 Jan 2021 05:15)  T(F): 97.6 (08 Jan 2021 07:26), Max: 98.6 (08 Jan 2021 05:15)  HR: 78 (08 Jan 2021 07:26) (78 - 108)  BP: 114/64 (08 Jan 2021 07:26) (113/74 - 123/68)  BP(mean): 90 (08 Jan 2021 05:15) (90 - 90)  RR: 18 (08 Jan 2021 07:26) (17 - 18)  SpO2: 96% (08 Jan 2021 11:47) (89% - 98%)    Physical exam:   constitutional NAD, AAOX3, Respiratory  lungs bilat fine crackles, CVS heart RRR, GI: abdomen Soft NT, ND, BS+, skin: intact  neuro exam non focal.                           10.8   6.12  )-----------( 405      ( 08 Jan 2021 07:26 )             30.8   01-08    136  |  99  |  11  ----------------------------<  314<H>  4.1   |  25  |  0.6<L>    Ca    8.5      08 Jan 2021 07:26    TPro  x   /  Alb  x   /  TBili  0.6  /  DBili  0.3<H>  /  AST  x   /  ALT  x   /  AlkPhos  x   01-08    D-Dimer Assay, Quantitative: 650 ng/mL DDU (01-07-21 @ 14:10)  Ferritin, Serum: 932 ng/mL (01-07-21 @ 14:10)  Procalcitonin, Serum: 0.06 ng/mL (01-07-21 @ 14:10)  C-Reactive Protein, Serum: 10.79 mg/dL (01.07.21 @ 14:10)    < from: CT Angio Chest PE Protocol w/ IV Cont (01.08.21 @ 01:02) >  Diffuse bilateral groundglass opacities with areas of linear atelectasis/scarring. Findings can be infectious/inflammatory including atypical/viral pneumonias.    Asymmetric left breast density. Recommend nonemergent correlation with mammography.    Hepatic steatosis.    Questionable 1 cm hypodense right thyroid gland nodule on image 1. Nonemergent thyroid ultrasound can be obtained for further evaluation.    < end of copied text >    a/p    # acute hypoxic resp failure, due to covid pna, cont O2,   dexamethasone, may switch to po steroids upon dc. finish 10 day course.     # breast density and thyroid nodule, pt is aware of the findings and will follow up as outpt. not new findings.     # Hypertension cont meds    # Diabetes mellitus cont meds    when O2 setup, may dc home, fu with PMD  if dyspnea is worsened. return to ER.   full code  at this time pt is stable and comfortable. may cont care as outpt   time spent 35 min           
HPI:  60 y.o lady with pmh of type 2 DM, HTN, came in for worsening sob over the last 3 days associated with fever of 12days duration   hx goes back to 12 days prior to presentation when the patient moved to [place] to spend christmas day with her daughter when she started feeling tired, weak and was having headache and low grade fever. 2 days later she starts having dry cough and worsening fever reaching 103,requiring tylenol every 6 hrs. was tested positive for covid on dec 27, 2020  she drived back from [place] to [place] on jan 1 2021 with her  and they did not stopped during their roadtrip   3 days later, she started having worsening shortness of breath and she decided to go to the ER today because she was not able to move around   in the ER , was found to have spo2 91% on RA , NC 3 liters applied and her spo2 went up to 96%, was given albuterol and 2 liters LR   tested negative for covid in ED   CXR: bilateral bibasilar predominant opacities  she will be admitted for further management  (07 Jan 2021 18:05)    Currently admitted to medicine with the primary diagnosis of COVID-19 virus infection       Today is hospital day 1d.     INTERVAL HPI / OVERNIGHT EVENTS:  Patient was examined and seen at bedside. This morning she is resting comfortably in bed and reports no new issues or overnight events.     ROS: Otherwise unremarkable     PAST MEDICAL & SURGICAL HISTORY  Hypertension    Diabetes mellitus    History of left knee surgery    S/P arthroscopy of left shoulder      ALLERGIES  No Known Allergies    MEDICATIONS  STANDING MEDICATIONS  ALBUTerol    90 MICROgram(s) HFA Inhaler 1 Puff(s) Inhalation every 6 hours  chlorhexidine 4% Liquid 1 Application(s) Topical <User Schedule>  enoxaparin Injectable 40 milliGRAM(s) SubCutaneous two times a day  insulin glargine Injectable (LANTUS) 12 Unit(s) SubCutaneous at bedtime  insulin lispro (ADMELOG) corrective regimen sliding scale   SubCutaneous three times a day before meals  insulin lispro Injectable (ADMELOG) 4 Unit(s) SubCutaneous three times a day before meals  losartan 50 milliGRAM(s) Oral daily  pantoprazole    Tablet 40 milliGRAM(s) Oral before breakfast  sodium chloride 0.9%. 1000 milliLiter(s) IV Continuous <Continuous>  timolol 0.5% Solution 1 Drop(s) Both EYES two times a day    PRN MEDICATIONS    VITALS:  T(F): 97.6  HR: 78  BP: 114/64  RR: 18  SpO2: 96%    PHYSICAL EXAM  GEN: NAD, Resting comfortably in bed  PULM: Clear to auscultation bilaterally, No wheezes  CVS: Regular rate and rhythm, S1-S2, no murmurs  ABD: Soft, non-tender, non-distended, no guarding  EXT: No edema  NEURO: A&Ox3, no focal deficits    LABS                        10.8   6.12  )-----------( 405      ( 08 Jan 2021 07:26 )             30.8     01-08    136  |  99  |  11  ----------------------------<  314<H>  4.1   |  25  |  0.6<L>    Ca    8.5      08 Jan 2021 07:26    TPro  x   /  Alb  x   /  TBili  0.6  /  DBili  0.3<H>  /  AST  x   /  ALT  x   /  AlkPhos  x   01-08          Troponin T, Serum: <0.01 ng/mL (01-07-21 @ 14:10)      CARDIAC MARKERS ( 07 Jan 2021 14:10 )  x     / <0.01 ng/mL / x     / x     / x          RADIOLOGY

## 2021-01-08 NOTE — PROGRESS NOTE ADULT - ASSESSMENT
60 y.o lady with pmh of type 2 DM, HTN, came in for worsening sob over the last 3 days associated with fever and dry cough of 12 days duration   was tested positive for covid on dec 27 when she was in [place], then tested negative on her presentation today to the ER  was found to be hypoxic and tachycardic on presentation. Patient has remained stable; saturating well on 2L NC    # COVID viral pneumonia:  - complicated by acute hypoxic respiratory failure with spo2 91% on RA / improved to 96% on 2 Liters NC   - keep spo2> 94%   - covid PCR negative 1/7/20  - start dexamethasone 6 mg iv daily for 10 days or till discharge, whichever is shorter  - not a candidate for convalescent plasma   - VA duplex LE negative  - CT angio PE protocol negative   - trend d-dimers  - lovenox 40 mg sc BID     # type 2 DM:  - basal / bolus insulin   - keep FS < 180  - hba1c pending      # HTN:  - Continue  losartan     # hyponatremia:  - Resolved      # DVT prophylaxis: lovenox 40 mg sc bid  # ppi prophylaxis: pantoprazole   # full code  # diet: carb consistent   # ambulate as tolerated   #Dispo: home with home oxygen and services

## 2021-01-08 NOTE — DISCHARGE NOTE PROVIDER - NSDCMRMEDTOKEN_GEN_ALL_CORE_FT
glipiZIDE 10 mg oral tablet: 1 tab(s) orally 2 times a day  Januvia 100 mg oral tablet: 1 tab(s) orally once a day  metFORMIN 1000 mg oral tablet: 1 tab(s) orally 2 times a day  pantoprazole 40 mg oral delayed release tablet: 1 tab(s) orally once a day  timolol hemihydrate 0.5% ophthalmic solution:

## 2021-01-08 NOTE — DISCHARGE NOTE PROVIDER - CARE PROVIDER_API CALL
ERNESTINA KEARNEY  [id number]  [address] [facility] [id number]  [place] [address]  Phone: [phone]  Fax: ([phone]  Follow Up Time: 1 week

## 2021-01-08 NOTE — ED ADULT NURSE REASSESSMENT NOTE - NS ED NURSE REASSESS COMMENT FT1
pt D/C instructions are done. all questions answered.  Pt is ready to go home. pt is waiting for transport via ambulance.

## 2021-01-08 NOTE — CHART NOTE - NSCHARTNOTEFT_GEN_A_CORE
- Despite treatment with furosemide, antibiotics, and/or steroids, patient is still hypoxic  - Patient saturating 94% on room air at rest  - Patient saturating 88% on room air with ambulation  - Patient saturating 96% on 2L O2 via nasal cannula with ambulation  - Patient tested in a chronic and stable state  - Patient is aware they are going home on oxygen

## 2021-01-08 NOTE — DISCHARGE NOTE NURSING/CASE MANAGEMENT/SOCIAL WORK - PATIENT PORTAL LINK FT
You can access the FollowMyHealth Patient Portal offered by [facility] by registering at the following website: [email or web address]. By joining [employer]’s FollowMyHealth portal, you will also be able to view your health information using other applications (apps) compatible with our system.

## 2021-01-08 NOTE — CONSULT NOTE ADULT - ASSESSMENT
ASSESSMENT  60 y.o lady with pmh of type 2 DM, HTN, came in for worsening sob over the last 3 days associated with fever of 12 days duration, tested COVID19 + 12/27, recent road trip to [place] for [place]    IMPRESSION  #Severe COVID-19 PNA requiring supplemental O2    Tested COVID19+ 12/27     CT GGOs, no PE    Admission WBC 7    D-Dimer Assay, Quantitative: 650 ng/mL DDU (01-07-21 @ 14:10)    Rapid RVP Result: NotDetec (01-07-21 @ 12:39)  #Hyponatremia   #Transaminitis   #Obesity BMI (kg/m2): 31.6  #DM      Creatinine, Serum: 0.7 (01-07-21 @ 14:10)      RECOMMENDATIONS  - F/u ferritin/ CRP  - Given timeline of symptoms, RDV and convalescent plasma are of little benefit as past the viral replicative phase  - Dexamethasone 6mg q24h x 10 days or until discharge (whichever is shorter)  - A/C per primary team  - Prone positioning if possible     If any questions, please call or send a message on [employer] Teams  Spectra [phone]

## 2021-01-08 NOTE — DISCHARGE NOTE PROVIDER - HOSPITAL COURSE
60 y.o lady with pmh of type 2 DM, HTN, came in for worsening sob over the last 3 days associated with fever of 12days duration. hx goes back to 12 days prior to presentation when the patient moved to [place] to spend christmas day with her daughter when she started feeling tired, weak and was having headache and low grade fever. 2 days later she starts having dry cough and worsening fever reaching 103,requiring tylenol every 6 hrs. was tested positive for covid on dec 27, 2020. she drove back from [place] to [place] on jan 1 2021 with her  and they did not stopp during their roadtrip.   3 days later, she started having worsening shortness of breath and she decided to go to the ER today because she was not able to move around   in the ER , was found to have spo2 91% on RA , NC 3 liters applied and her spo2 went up to 96%, was given albuterol and 2 liters LR.     CTA ruled out PE. Patient oxygenation good on 2L NC, sent home on 2L NC    60 y.o lady with pmh of type 2 DM, HTN, came in for worsening sob over the last 3 days associated with fever of 12days duration. hx goes back to 12 days prior to presentation when the patient moved to [place] to spend christmas day with her daughter when she started feeling tired, weak and was having headache and low grade fever. 2 days later she starts having dry cough and worsening fever reaching 103,requiring tylenol every 6 hrs. was tested positive for covid on dec 27, 2020. she drove back from [place] to [place] on jan 1 2021 with her  and they did not stop during their road trip. 3 days later, she started having worsening shortness of breath and she decided to go to the ER today because she was not able to move around without dyspnea.   In the ER , was found to have spo2 91% on RA , NC 3 liters applied and her spo2 went up to 96%, was given albuterol and 2 liters LR.     CTA ruled out PE. Patient oxygenation good on 2L NC, sent home on 2L NC.

## 2021-01-08 NOTE — DISCHARGE NOTE PROVIDER - NSDCCPCAREPLAN_GEN_ALL_CORE_FT
PRINCIPAL DISCHARGE DIAGNOSIS  Diagnosis: COVID-19 virus infection  Assessment and Plan of Treatment: Coronavirus disease 2019 (COVID-19) is a respiratory illness  that can spread from person to person. The virus that causes  COVID-19 is a novel coronavirus that was first identified during  an investigation into an outbreak in [place], [place].  The virus that causes COVID-19 probably emerged from an  animal source, but is now spreading from person to person.  The virus is thought to spread mainly between people who  are in close contact with one another (within about 6 feet)  through respiratory droplets produced when an infected  person coughs or sneezes. It also may be possible that a person  can get COVID-19 by touching a surface or object that has  the virus on it and then touching their own mouth, nose, or  possibly their eyes, but this is not thought to be the main  way the virus spreads.  Please stay home and avoid contact with others for at least a week after symptoms resolve and follow government guidelines.   Patients with COVID-19 have had mild to severe respiratory  illness with symptoms of  • fever  • cough  • shortness of breath  People can help protect themselves from respiratory illness with  everyday preventive actions.    • Avoid close contact with people who are sick.  • Avoid touching your eyes, nose, and mouth with  unwashed hands.  • Wash your hands often with soap and water for at least 20   seconds. Use an alcohol-based hand  that contains at  least 60% alcohol if soap and water are not available.   Stay home when you are sick.  • Cover your cough or sneeze with a tissue, then throw the  tissue in the trash.  • Clean and disinfect frequently touched objects  and surfaces.  Call 911 and inform them you are covid positive before you decide to go to the emergency room if you have chest pain, difficulty breathing, high fevers, worsening of your symptoms, feel unwell, or have nausea and vomiting.      SECONDARY DISCHARGE DIAGNOSES  Diagnosis: Hypoxia  Assessment and Plan of Treatment:

## 2021-01-13 DIAGNOSIS — E11.65 TYPE 2 DIABETES MELLITUS WITH HYPERGLYCEMIA: ICD-10-CM

## 2021-01-13 DIAGNOSIS — R06.02 SHORTNESS OF BREATH: ICD-10-CM

## 2021-01-13 DIAGNOSIS — R74.01 ELEVATION OF LEVELS OF LIVER TRANSAMINASE LEVELS: ICD-10-CM

## 2021-01-13 DIAGNOSIS — U07.1 COVID-19: ICD-10-CM

## 2021-01-13 DIAGNOSIS — A08.39 OTHER VIRAL ENTERITIS: ICD-10-CM

## 2021-01-13 DIAGNOSIS — K76.0 FATTY (CHANGE OF) LIVER, NOT ELSEWHERE CLASSIFIED: ICD-10-CM

## 2021-01-13 DIAGNOSIS — Z87.891 PERSONAL HISTORY OF NICOTINE DEPENDENCE: ICD-10-CM

## 2021-01-13 DIAGNOSIS — I10 ESSENTIAL (PRIMARY) HYPERTENSION: ICD-10-CM

## 2021-01-13 DIAGNOSIS — J12.82 PNEUMONIA DUE TO CORONAVIRUS DISEASE 2019: ICD-10-CM

## 2021-01-13 DIAGNOSIS — E66.9 OBESITY, UNSPECIFIED: ICD-10-CM

## 2021-01-13 DIAGNOSIS — R92.8 OTHER ABNORMAL AND INCONCLUSIVE FINDINGS ON DIAGNOSTIC IMAGING OF BREAST: ICD-10-CM

## 2021-01-13 DIAGNOSIS — E04.1 NONTOXIC SINGLE THYROID NODULE: ICD-10-CM

## 2021-01-13 DIAGNOSIS — Z79.84 LONG TERM (CURRENT) USE OF ORAL HYPOGLYCEMIC DRUGS: ICD-10-CM

## 2021-01-13 DIAGNOSIS — E86.0 DEHYDRATION: ICD-10-CM

## 2021-01-13 DIAGNOSIS — J96.01 ACUTE RESPIRATORY FAILURE WITH HYPOXIA: ICD-10-CM

## 2021-01-13 DIAGNOSIS — R94.31 ABNORMAL ELECTROCARDIOGRAM [ECG] [EKG]: ICD-10-CM

## 2021-01-13 DIAGNOSIS — E87.1 HYPO-OSMOLALITY AND HYPONATREMIA: ICD-10-CM

## 2021-01-13 LAB
CULTURE RESULTS: SIGNIFICANT CHANGE UP
SPECIMEN SOURCE: SIGNIFICANT CHANGE UP

## 2021-01-20 ENCOUNTER — APPOINTMENT (OUTPATIENT)
Dept: CARDIOLOGY | Facility: CLINIC | Age: 61
End: 2021-01-20
Payer: MEDICARE

## 2021-01-20 VITALS
BODY MASS INDEX: 29.66 KG/M2 | HEART RATE: 109 BPM | DIASTOLIC BLOOD PRESSURE: 70 MMHG | WEIGHT: 178 LBS | TEMPERATURE: 97.8 F | SYSTOLIC BLOOD PRESSURE: 100 MMHG | HEIGHT: 65 IN

## 2021-01-20 DIAGNOSIS — Z00.00 ENCOUNTER FOR GENERAL ADULT MEDICAL EXAMINATION W/OUT ABNORMAL FINDINGS: ICD-10-CM

## 2021-01-20 PROCEDURE — 93000 ELECTROCARDIOGRAM COMPLETE: CPT

## 2021-01-20 PROCEDURE — 99214 OFFICE O/P EST MOD 30 MIN: CPT

## 2021-01-20 NOTE — REASON FOR VISIT
[FreeTextEntry1] : Patient presents for an office visit after being seen in the ER because of COVID-19 exposure and was placed on home O2. She is c/o palpitations

## 2021-01-20 NOTE — PHYSICAL EXAM
[General Appearance - Well Developed] : well developed [Normal Appearance] : normal appearance [General Appearance - Well Nourished] : well nourished [Normal Conjunctiva] : the conjunctiva exhibited no abnormalities [Normal Oropharynx] : normal oropharynx [Normal Jugular Venous V Waves Present] : normal jugular venous V waves present [Respiration, Rhythm And Depth] : normal respiratory rhythm and effort [Auscultation Breath Sounds / Voice Sounds] : lungs were clear to auscultation bilaterally [Heart Sounds] : normal S1 and S2 [Heart Rate And Rhythm] : heart rate and rhythm were normal [Arterial Pulses Normal] : the arterial pulses were normal [Edema] : no peripheral edema present [Abdomen Soft] : soft [Abdomen Tenderness] : non-tender [Cyanosis, Localized] : no localized cyanosis [Skin Color & Pigmentation] : normal skin color and pigmentation

## 2021-01-20 NOTE — DISCUSSION/SUMMARY
[FreeTextEntry1] : Holter monitor\par Patient is awaiting COVID-19 testing results\par The patient was advised to maintain her present medications.\par She was advised to lower her T. cholesterol level to less than 200 mg/dl and LDL to less than 100 mg/dl.\par Start an exercise program.\par Maintain a low fat, low cholesterol diet.\par Weight reduction is advised.\par RV in 3 weeks.\par She is on home oxygen and not using it presently.

## 2021-01-22 ENCOUNTER — APPOINTMENT (OUTPATIENT)
Dept: CARDIOLOGY | Facility: CLINIC | Age: 61
End: 2021-01-22
Payer: MEDICARE

## 2021-01-22 PROCEDURE — 93242 EXT ECG>48HR<7D RECORDING: CPT

## 2021-02-10 ENCOUNTER — TRANSCRIPTION ENCOUNTER (OUTPATIENT)
Age: 61
End: 2021-02-10

## 2021-02-10 ENCOUNTER — APPOINTMENT (OUTPATIENT)
Dept: CARDIOLOGY | Facility: CLINIC | Age: 61
End: 2021-02-10
Payer: MEDICARE

## 2021-02-10 VITALS
BODY MASS INDEX: 31.32 KG/M2 | WEIGHT: 188 LBS | HEIGHT: 65 IN | DIASTOLIC BLOOD PRESSURE: 70 MMHG | SYSTOLIC BLOOD PRESSURE: 130 MMHG | TEMPERATURE: 97.5 F | HEART RATE: 86 BPM

## 2021-02-10 PROCEDURE — 99214 OFFICE O/P EST MOD 30 MIN: CPT | Mod: CS

## 2021-02-10 PROCEDURE — 93000 ELECTROCARDIOGRAM COMPLETE: CPT

## 2021-02-10 NOTE — PHYSICAL EXAM
[General Appearance - Well Developed] : well developed [Normal Appearance] : normal appearance [General Appearance - Well Nourished] : well nourished [Normal Conjunctiva] : the conjunctiva exhibited no abnormalities [Normal Oropharynx] : normal oropharynx [Normal Jugular Venous V Waves Present] : normal jugular venous V waves present [Respiration, Rhythm And Depth] : normal respiratory rhythm and effort [Auscultation Breath Sounds / Voice Sounds] : lungs were clear to auscultation bilaterally [Heart Rate And Rhythm] : heart rate and rhythm were normal [Heart Sounds] : normal S1 and S2 [Arterial Pulses Normal] : the arterial pulses were normal [Edema] : no peripheral edema present [Abdomen Soft] : soft [Abdomen Tenderness] : non-tender [Cyanosis, Localized] : no localized cyanosis [Skin Color & Pigmentation] : normal skin color and pigmentation

## 2021-02-10 NOTE — DISCUSSION/SUMMARY
[FreeTextEntry1] : Holter monitor was reviewed with the patient and was NSR\par The patient was advised to maintain her present medications.\par She was advised to lower her T. cholesterol level to less than 200 mg/dl and LDL to less than 100 mg/dl.\par Start an exercise program.\par Maintain a low fat, low cholesterol diet.\par Weight reduction is advised.\par RV in 6 months.

## 2021-02-10 NOTE — REASON FOR VISIT
[FreeTextEntry1] : Patient presents for follow up of her Holter monitor results after COVID-19 exposure. She is off her home oxygen and feels much better. Her HR has returned to a normal range.

## 2021-02-23 ENCOUNTER — FORM ENCOUNTER (OUTPATIENT)
Age: 61
End: 2021-02-23

## 2021-08-04 ENCOUNTER — APPOINTMENT (OUTPATIENT)
Dept: CARDIOLOGY | Facility: CLINIC | Age: 61
End: 2021-08-04
Payer: MEDICARE

## 2021-08-04 VITALS
TEMPERATURE: 98.7 F | HEART RATE: 82 BPM | HEIGHT: 65 IN | SYSTOLIC BLOOD PRESSURE: 110 MMHG | WEIGHT: 192 LBS | BODY MASS INDEX: 31.99 KG/M2 | DIASTOLIC BLOOD PRESSURE: 80 MMHG

## 2021-08-04 PROCEDURE — 93000 ELECTROCARDIOGRAM COMPLETE: CPT

## 2021-08-04 PROCEDURE — 99214 OFFICE O/P EST MOD 30 MIN: CPT | Mod: CS

## 2021-08-04 NOTE — DISCUSSION/SUMMARY
[FreeTextEntry1] : Holter monitor was reviewed with the patient and was NSR. This was discussed on prior visit. \par 12 lead EKG performed today revealed Normal findings.\par The patient was advised to maintain her present medications.\par She was advised to lower her T. cholesterol level to less than 200 mg/dl and LDL to less than 100 mg/dl.\par Start an exercise program.\par She is advised to see an endocrinologist given her elevated serum glucose and HgA1c\par Maintain a low fat, low cholesterol diet.\par Weight reduction is advised.\par BMP lipid and hepatic panel HgA1c\par RV in 6 months.

## 2021-08-05 ENCOUNTER — APPOINTMENT (OUTPATIENT)
Dept: OBGYN | Facility: CLINIC | Age: 61
End: 2021-08-05
Payer: MEDICARE

## 2021-08-05 VITALS
DIASTOLIC BLOOD PRESSURE: 82 MMHG | BODY MASS INDEX: 32.32 KG/M2 | WEIGHT: 194 LBS | TEMPERATURE: 97.5 F | SYSTOLIC BLOOD PRESSURE: 128 MMHG | HEIGHT: 65 IN

## 2021-08-05 DIAGNOSIS — Z87.891 PERSONAL HISTORY OF NICOTINE DEPENDENCE: ICD-10-CM

## 2021-08-05 PROCEDURE — 76830 TRANSVAGINAL US NON-OB: CPT

## 2021-08-05 PROCEDURE — 99213 OFFICE O/P EST LOW 20 MIN: CPT | Mod: 25

## 2021-08-05 NOTE — HISTORY OF PRESENT ILLNESS
[FreeTextEntry1] : pressure, crampiness today\par dyspareunia\par \par last luiz visit:\par \par \par    	Print\par Patient\par Name	DAHLIA OWUSU (61yo, F) ID# [id number]	Appt. Date/Time	2021 12:20PM\par 	1960	Service Dept.	[facility] SI OFFICE\par Provider	EMILY FINE MD\par Insurance	\par Med Primary: MEDICARE-[facility] (MEDICARE)\par Insurance # : [id number]\par Employer Name : UNEMPLOYED\par Med Secondary: [employer] (PPO)\par Insurance # : [id number]\par Prescription: [employer] - This member could not be found in the payer's files. Please verify coverage and all member demographic information. details\par Chief Complaint\par annual gyn exam\par Patient's Care Team\par Primary Care Provider: SARABJIT NUÑEZ: [address] [place] [address], Ph [phone], Fax [phone] NPI: [id number]\par Patient's Pharmacies\par [id number] (ERX): [address] [place] [address], Ph [phone], Fax [phone]\par [facility] (MAIL-ORDER, ERX): [address] [place] [address], Ph [phone], Fax [phone]\par Vitals\par 2021 12:12 pm\par Ht:	5 ft 5 in\par Wt:	178 lbs\par BMI:	29.6\par BP:	140/80\par Allergies\par Reviewed Allergies\par NKDA\par CATS AND SEASONAL\par Medications\par Reviewed Medications\par acetaminophen 300 mg-codeine 30 mg tablet\par TK 1 T PO Q 4 TO 6 H PRN\par 12/10/19   filled	surescripts\par atorvastatin 40 mg tablet\par TK 1 T PO D\par 20   filled	surescripts\par atorvastatin 80 mg tablet\par TK 1 T PO D\par 19   filled	surescripts\par ciprofloxacin 250 mg tablet\par TK 1 T PO Q 12 H FOR 3 DAYS\par 20   filled	surescripts\par ciprofloxacin 500 mg tablet\par TK 1 T PO BID\par 20   filled	surescripts\par clindamycin 100 mg vaginal suppository\par Insert 1 suppositor(y/ies) every day by vaginal route as directed for 3 days.\par 06/15/15   prescribed	Emily Fine MD\par clotrimazole-betamethasone 1 %-0.05 % topical cream\par JEAN MARIE 2 GRAMS EXT AA BID FOR 7 DAYS UTD\par 20   filled	surescripts\par dexAMETHasone 6 mg tablet\par TAKE 1 TABLET BY MOUTH EVERY DAY\par 21   filled	surescripts\par famotidine 40 mg tablet\par TAKE 1 TABLET BY MOUTH DAILY\par 12/10/20   filled	surescripts\par fluconazole 150 mg tablet\par TK 1 T PO D\par 10/19/20   filled	surescripts\par fluconazole 200 mg tablet\par Take 1 tablet(s) every day by oral route as directed for 3 days.\par Note: Take 1 tablet on Day #1; 1 tablet on Day # 4 & 1 tablet on Day # 7\par 19   prescribed	Emily Fine MD\par hydrocortisone 2.5 % topical cream with perineal applicator\par Apply 1 application(s) twice a day by topical route for 10 days.\par 18   prescribed	Emily Fine MD\par hydrocortisone acetate 25 mg rectal suppository\par Insert 1 suppositor(y/ies) twice a day by rectal route for 14 days.\par 18   prescribed	Emily Fine MD\par Januvia\par 05/11/15   entered	Vance Sims\par latanoprost 0.005 % eye drops\par INT 1 GTT IN OU HS\par 20   filled	surescripts\par Lipitor\par 05/11/15   entered	Vance Sims\par losartan\par 12/14/15   entered	Vance Sims\par Macrobid 100 mg capsule\par Take 1 capsule(s) every 12 hours by oral route for 7 days.\par 12/21/15   prescribed	Emily Fine MD\par metFORMIN\par 05/11/15   entered	Vance Sims\par metFORMIN 500 mg tablet\par TK 1 T PO BID\par 19   filled	surescripts\par metroNIDAZOLE 500 mg tablet\par TK 1 T PO TID\par 20   filled	surescripts\par mupirocin 2 % topical ointment\par APPLY SMALL AMOUNT EXTERNALLY TO THE AFFECTED AREA TWICE DAILY\par 21   filled	surescripts\par OneTouch Delica Plus Lancet 30 gauge\par TEST D\par 20   filled	surescripts\par OneTouch Delica Plus Lancet 33 gauge\par TEST DAILY\par 21   filled	surescripts\par OneTouch Verio Meter\par USE UTD\par 19   filled	surescripts\par OneTouch Verio test strips\par TEST DAILY\par 21   filled	surescripts\par Proctosol HC 2.5 % rectal cream with applicator\par Insert 1 g every day by rectal route.\par 16   prescribed	Emily Fine MD\par sucralfate 1 gram tablet\par TK 1 T PO TID\par 06/15/20   filled	surescripts\par Problems\par Reviewed Problems\par External hemorrhoids\par Urinary tract infectious disease\par Vaginitis and vulvovaginitis\par Dysplasia of cervix\par Atypical squamous cells of undetermined significance on cervical Papanicolaou smear\par Cervicovaginal cytology: Low grade squamous intraepithelial lesion\par Abnormal cervical Papanicolaou smear with positive human papillomavirus deoxyribonucleic acid test\par Gynecologic examination\par Family History\par Family History not reviewed (last reviewed 2020)\par Father	- Malignant neoplastic disease\par - STOMACH (previously recorded as Cancer)\par Mother	- Kidney disease ( age: 88)\par Social History\par Reviewed Social History\par 2019 novel coronavirus (2019-nCoV)\par Has patient visited an area known to be high risk for 2019 n-CoV?: N\par In the 14 days before symptom onset, did the patient spend time in [facility]?: N\par Tobacco Smoking Status: Former smoker (Notes: quit 3 yrs ago)\par Smokeless Tobacco Status: Never used smokeless tobacco\par E-cigarette/Vape Status: Never used electronic cigarettes\par Most Recent Tobacco Use Screenin2021\par Surgical History\par Reviewed Surgical History\par Cholecystectomy\par Dilation and Curettage\par Other - ORTHOPEDIC\par GYN History\par Reviewed GYN History\par LMP: Definite.\par Menses Monthly: N.\par Date of LMP: (Notes: ).\par Obstetric History\par Obstetric History not reviewed (last reviewed 2020)\par TOTAL	FULL	PRE	AB. I	AB. 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 She reports no abnormal moles and no rashes. She reports no irritation and no vision changes. She reports no hearing loss, no ear pain, no nose/sinus problems, no sore throat, no snoring, no dry mouth, and no mouth ulcers. She reports no dyspnea / shortness of breath, no cough, no sputum production, no hemoptysis, and no wheezing. She reports no chest pain, no palpitations, and no orthopnea. She reports no heartburn, no dysphagia, no nausea, no vomiting, no abdominal pain, no bowel movement changes, no diarrhea, no constipation, and no rectal bleeding. She reports no hematuria, no abnormal bleeding, no flank pain, no trouble urinating, no incontinence, no rash, no lesion, no discharge, no vaginal odor, and no vaginal itching. She reports no menstrual problems and no PMDD symptoms. She reports no menopausal symptoms. She reports no sexual problems. She reports no muscle aches, no muscle weakness, no arthralgias/joint pain, and no back pain. She reports no headaches, no dizziness, no LOC, no weakness, no numbness, and no seizures. She reports no depression, no alcoholism, and no sleep disturbances.\par Physical Exam\par Patient is a 60-year-old female.\par \par Chaperone: Chaperone: present.\par \par Female Genitalia: Vulva: no masses, atrophy, or lesions. Bladder/Urethra: no urethral discharge or mass and normal meatus and bladder non distended. Vagina no tenderness, erythema, cystocele, rectocele, abnormal vaginal discharge, or vesicle(s) or ulcers. Cervix: no discharge or cervical motion tenderness and grossly normal; stenosis. Uterus: normal size and shape and midline, mobile, non-tender, and no uterine prolapse. Adnexa/Parametria: no parametrial tenderness or mass and no adnexal tenderness or ovarian mass.\par \par Breast: Inspection/Palpation: no erythema, induration, tenderness, skin changes, abnormal secretions, or distinct masses and normal nipple appearance and non tender axillary lymph nodes.\par \par Abdomen: Auscultation/Inspection/Palpation: no tenderness, hepatomegaly, splenomegaly, masses, or CVA tenderness and soft, non-distended, and normal bowel sounds. Hernia: none palpated.\par Assessment / Plan\par 1. Gynecologic examination -\par mammo\par \par Z01.411: Encounter for gynecological examination (general) (routine) with abnormal findings\par PAP, LB\par \par 2. Noninflammatory disorder of uterus -\par small amt of fluid: 0.3cc...observe\par \par unchanged\par \par inaccessible polyp was 2mm\par \par no need to intervene\par \par sonos q 6 mths to assess stability\par \par if patient starts bleeding or things change, consider intervention\par \par N85.9: Noninflammatory disorder of uterus, unspecified\par US, TRANSVAGINAL\par \par US, TRANSVAGINAL\par \par Review of us, transvaginal taken on 2021 at [facility] OFFICE shows:\par Imaging Studies:\par Uterus: volume (cc): 40.\par Endometrium: thickness 3.8 mm.\par Cervix: normal.\par Cul de sac: no fluid was demonstrated.\par Right Ovary: volume (cc): 0.8.\par Left Ovary: volume (cc): 0.6.\par \par Return to Office\par None recorded.\par Encounter Sign-Off\par Encounter signed-off by Emily Fine MD, 2021.\par Encounter performed and documented by Emily Fine MD\par Encounter reviewed & signed by Emily Fine MD on 2021 at 12:53pm

## 2021-08-05 NOTE — PHYSICAL EXAM
[Labia Majora] : normal [Labia Minora] : normal [Atrophy] : atrophy [Cystocele] : a cystocele [Normal] : normal [Tenderness] : tender [Uterine Adnexae] : normal

## 2021-08-05 NOTE — PROCEDURE
[Transvaginal Ultrasound] : transvaginal ultrasound [FreeTextEntry3] : lining thicker\par cramps [FreeTextEntry5] : 50.7cc/   6.4mm [FreeTextEntry7] : 1.1cc [FreeTextEntry8] : 0.9cc

## 2021-08-12 LAB — HPV HIGH+LOW RISK DNA PNL CVX: NOT DETECTED

## 2021-08-15 ENCOUNTER — RX RENEWAL (OUTPATIENT)
Age: 61
End: 2021-08-15

## 2021-08-19 ENCOUNTER — NON-APPOINTMENT (OUTPATIENT)
Age: 61
End: 2021-08-19

## 2021-08-21 LAB — CYTOLOGY CVX/VAG DOC THIN PREP: NORMAL

## 2021-08-25 ENCOUNTER — NON-APPOINTMENT (OUTPATIENT)
Age: 61
End: 2021-08-25

## 2021-08-27 ENCOUNTER — NON-APPOINTMENT (OUTPATIENT)
Age: 61
End: 2021-08-27

## 2021-09-01 ENCOUNTER — APPOINTMENT (OUTPATIENT)
Dept: OBGYN | Facility: CLINIC | Age: 61
End: 2021-09-01
Payer: MEDICARE

## 2021-09-01 PROCEDURE — 99212 OFFICE O/P EST SF 10 MIN: CPT | Mod: 95

## 2021-09-01 PROCEDURE — 99202 OFFICE O/P NEW SF 15 MIN: CPT | Mod: 95

## 2021-09-01 NOTE — HISTORY OF PRESENT ILLNESS
[FreeTextEntry1] : Today\par \par \par \par \par Last Visit pre MRI:\par \par pressure, crampiness today\par dyspareunia\par \par last luiz visit:\par \par \par    	Print\par Patient\par Name	DAHLIA OWUSU (61yo, F) ID# [id number]	Appt. Date/Time	2021 12:20PM\par 	1960	Service Dept.	[facility] SI OFFICE\par Provider	EMILY FINE MD\par Insurance	\par Med Primary: MEDICARE-[facility] (MEDICARE)\par Insurance # : [id number]\par Employer Name : UNEMPLOYED\par Med Secondary: [employer] (PPO)\par Insurance # : [id number]\par Prescription: [employer] - This member could not be found in the payer's files. Please verify coverage and all member demographic information. details\par Chief Complaint\par annual gyn exam\par Patient's Care Team\par Primary Care Provider: SARABJIT NUÑEZ: [address] [place] [address], Ph [phone], Fax [phone] NPI: [id number]\par Patient's Pharmacies\par [id number] (ERX): [address] [place] [address], Ph [phone], Fax [phone]\par [facility] (MAIL-ORDER, ERX): [address] [place] [address], Ph [phone], Fax [phone]\par Vitals\par 2021 12:12 pm\par Ht:	5 ft 5 in\par Wt:	178 lbs\par BMI:	29.6\par BP:	140/80\par Allergies\par Reviewed Allergies\par NKDA\par CATS AND SEASONAL\par Medications\par Reviewed Medications\par acetaminophen 300 mg-codeine 30 mg tablet\par TK 1 T PO Q 4 TO 6 H PRN\par 12/10/19   filled	surescripts\par atorvastatin 40 mg tablet\par TK 1 T PO D\par 20   filled	surescripts\par atorvastatin 80 mg tablet\par TK 1 T PO D\par 19   filled	surescripts\par ciprofloxacin 250 mg tablet\par TK 1 T PO Q 12 H FOR 3 DAYS\par 20   filled	surescripts\par ciprofloxacin 500 mg tablet\par TK 1 T PO BID\par 20   filled	surescripts\par clindamycin 100 mg vaginal suppository\par Insert 1 suppositor(y/ies) every day by vaginal route as directed for 3 days.\par 06/15/15   prescribed	Emily Fine MD\par clotrimazole-betamethasone 1 %-0.05 % topical cream\par JEAN MARIE 2 GRAMS EXT AA BID FOR 7 DAYS UTD\par 20   filled	surescripts\par dexAMETHasone 6 mg tablet\par TAKE 1 TABLET BY MOUTH EVERY DAY\par 21   filled	surescripts\par famotidine 40 mg tablet\par TAKE 1 TABLET BY MOUTH DAILY\par 12/10/20   filled	surescripts\par fluconazole 150 mg tablet\par TK 1 T PO D\par 10/19/20   filled	surescripts\par fluconazole 200 mg tablet\par Take 1 tablet(s) every day by oral route as directed for 3 days.\par Note: Take 1 tablet on Day #1; 1 tablet on Day # 4 & 1 tablet on Day # 7\par 19   prescribed	Emily Fine MD\par hydrocortisone 2.5 % topical cream with perineal applicator\par Apply 1 application(s) twice a day by topical route for 10 days.\par 18   prescribed	Emily Fine MD\par hydrocortisone acetate 25 mg rectal suppository\par Insert 1 suppositor(y/ies) twice a day by rectal route for 14 days.\par 18   prescribed	Emily Fine MD\par Januvia\par 05/11/15   entered	Vance Sims\par latanoprost 0.005 % eye drops\par INT 1 GTT IN OU HS\par 20   filled	surescripts\par Lipitor\par 05/11/15   entered	Vance Sims\par losartan\par 12/14/15   entered	Vance Sims\par Macrobid 100 mg capsule\par Take 1 capsule(s) every 12 hours by oral route for 7 days.\par 12/21/15   prescribed	Emily Fine MD\par metFORMIN\par 05/11/15   entered	Vance [place]\par metFORMIN 500 mg tablet\par TK 1 T PO BID\par 19   filled	surescripts\par metroNIDAZOLE 500 mg tablet\par TK 1 T PO TID\par 20   filled	surescripts\par mupirocin 2 % topical ointment\par APPLY SMALL AMOUNT EXTERNALLY TO THE AFFECTED AREA TWICE DAILY\par 21   filled	surescripts\par OneTouch Delica Plus Lancet 30 gauge\par TEST D\par 20   filled	surescripts\par OneTouch Delica Plus Lancet 33 gauge\par TEST DAILY\par 21   filled	surescripts\par OneTouch Verio Meter\par USE UTD\par 19   filled	surescripts\par OneTouch Verio test strips\par TEST DAILY\par 21   filled	surescripts\par Proctosol HC 2.5 % rectal cream with applicator\par Insert 1 g every day by rectal route.\par 16   prescribed	Emily Fine MD\par sucralfate 1 gram tablet\par TK 1 T PO TID\par 06/15/20   filled	surescripts\par Problems\par Reviewed Problems\par External hemorrhoids\par Urinary tract infectious disease\par Vaginitis and vulvovaginitis\par Dysplasia of cervix\par Atypical squamous cells of undetermined significance on cervical Papanicolaou smear\par Cervicovaginal cytology: Low grade squamous intraepithelial lesion\par Abnormal cervical Papanicolaou smear with positive human papillomavirus deoxyribonucleic acid test\par Gynecologic examination\par Family History\par Family History not reviewed (last reviewed 2020)\par Father	- Malignant neoplastic disease\par - STOMACH (previously recorded as Cancer)\par Mother	- Kidney disease ( age: 88)\par Social History\par Reviewed Social History\par 2019 novel coronavirus (2019-nCoV)\par Has patient visited an area known to be high risk for 2019 n-CoV?: N\par In the 14 days before symptom onset, did the patient spend time in [facility]?: N\par Tobacco Smoking Status: Former smoker (Notes: quit 3 yrs ago)\par Smokeless Tobacco Status: Never used smokeless tobacco\par E-cigarette/Vape Status: Never used electronic cigarettes\par Most Recent Tobacco Use Screenin2021\par Surgical History\par Reviewed Surgical History\par Cholecystectomy\par Dilation and Curettage\par Other - ORTHOPEDIC\par GYN History\par Reviewed GYN History\par LMP: Definite.\par Menses Monthly: N.\par Date of LMP: (Notes: ).\par Obstetric History\par Obstetric History not reviewed (last reviewed 2020)\par TOTAL	FULL	PRE	AB. I	AB. 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 She reports no abnormal moles and no rashes. She reports no irritation and no vision changes. She reports no hearing loss, no ear pain, no nose/sinus problems, no sore throat, no snoring, no dry mouth, and no mouth ulcers. She reports no dyspnea / shortness of breath, no cough, no sputum production, no hemoptysis, and no wheezing. She reports no chest pain, no palpitations, and no orthopnea. She reports no heartburn, no dysphagia, no nausea, no vomiting, no abdominal pain, no bowel movement changes, no diarrhea, no constipation, and no rectal bleeding. She reports no hematuria, no abnormal bleeding, no flank pain, no trouble urinating, no incontinence, no rash, no lesion, no discharge, no vaginal odor, and no vaginal itching. She reports no menstrual problems and no PMDD symptoms. She reports no menopausal symptoms. She reports no sexual problems. She reports no muscle aches, no muscle weakness, no arthralgias/joint pain, and no back pain. She reports no headaches, no dizziness, no LOC, no weakness, no numbness, and no seizures. She reports no depression, no alcoholism, and no sleep disturbances.\par Physical Exam\par Patient is a 60-year-old female.\par \par Chaperone: Chaperone: present.\par \par Female Genitalia: Vulva: no masses, atrophy, or lesions. Bladder/Urethra: no urethral discharge or mass and normal meatus and bladder non distended. Vagina no tenderness, erythema, cystocele, rectocele, abnormal vaginal discharge, or vesicle(s) or ulcers. Cervix: no discharge or cervical motion tenderness and grossly normal; stenosis. Uterus: normal size and shape and midline, mobile, non-tender, and no uterine prolapse. Adnexa/Parametria: no parametrial tenderness or mass and no adnexal tenderness or ovarian mass.\par \par Breast: Inspection/Palpation: no erythema, induration, tenderness, skin changes, abnormal secretions, or distinct masses and normal nipple appearance and non tender axillary lymph nodes.\par \par Abdomen: Auscultation/Inspection/Palpation: no tenderness, hepatomegaly, splenomegaly, masses, or CVA tenderness and soft, non-distended, and normal bowel sounds. Hernia: none palpated.\par Assessment / Plan\par 1. Gynecologic examination -\par mammo\par \par Z01.411: Encounter for gynecological examination (general) (routine) with abnormal findings\par PAP, LB\par \par 2. Noninflammatory disorder of uterus -\par small amt of fluid: 0.3cc...observe\par \par unchanged\par \par inaccessible polyp was 2mm\par \par no need to intervene\par \par sonos q 6 mths to assess stability\par \par if patient starts bleeding or things change, consider intervention\par \par N85.9: Noninflammatory disorder of uterus, unspecified\par US, TRANSVAGINAL\par \par US, TRANSVAGINAL\par \par Review of us, transvaginal taken on 2021 at [facility] OFFICE shows:\par Imaging Studies:\par Uterus: volume (cc): 40.\par Endometrium: thickness 3.8 mm.\par Cervix: normal.\par Cul de sac: no fluid was demonstrated.\par Right Ovary: volume (cc): 0.8.\par Left Ovary: volume (cc): 0.6.\par \par Return to Office\par None recorded.\par Encounter Sign-Off\par Encounter signed-off by Emily Fine MD, 2021.\par Encounter performed and documented by Emily Fine MD\par Encounter reviewed & signed by Emily Fine MD on 2021 at 12:53pm

## 2021-09-01 NOTE — REASON FOR VISIT
[Home] : at home, [unfilled] , at the time of the visit. [Medical Office: ([facility])___] : at the medical office located in  [Verbal consent obtained from patient] : the patient, [unfilled] [Follow-Up] : a follow-up evaluation of

## 2021-11-29 ENCOUNTER — NON-APPOINTMENT (OUTPATIENT)
Age: 61
End: 2021-11-29

## 2021-11-29 DIAGNOSIS — B37.9 CANDIDIASIS, UNSPECIFIED: ICD-10-CM

## 2021-11-29 DIAGNOSIS — T36.95XA CANDIDIASIS, UNSPECIFIED: ICD-10-CM

## 2022-02-02 ENCOUNTER — APPOINTMENT (OUTPATIENT)
Dept: CARDIOLOGY | Facility: CLINIC | Age: 62
End: 2022-02-02
Payer: MEDICARE

## 2022-02-02 VITALS
DIASTOLIC BLOOD PRESSURE: 80 MMHG | BODY MASS INDEX: 32.32 KG/M2 | TEMPERATURE: 98.3 F | WEIGHT: 194 LBS | HEART RATE: 98 BPM | SYSTOLIC BLOOD PRESSURE: 120 MMHG | HEIGHT: 65 IN

## 2022-02-02 DIAGNOSIS — U07.1 COVID-19: ICD-10-CM

## 2022-02-02 PROCEDURE — 99214 OFFICE O/P EST MOD 30 MIN: CPT | Mod: CS

## 2022-02-02 PROCEDURE — 93000 ELECTROCARDIOGRAM COMPLETE: CPT

## 2022-02-02 NOTE — ASSESSMENT
[FreeTextEntry1] : Palpitations have resolved\par Hypertension\par Hyperlipidemia\par DM\par S/P COVID -19 infection with O2 dependency at that time

## 2022-02-02 NOTE — DISCUSSION/SUMMARY
[FreeTextEntry1] : Holter monitor was reviewed with the patient and was NSR. This was discussed on prior visit. \par 12 lead EKG performed today revealed Normal findings.\par She is off oxygen therapy.\par The patient was advised to maintain her present medications.\par She was advised to lower her T. cholesterol level to less than 200 mg/dl and LDL to less than 100 mg/dl.\par Start an exercise program.\par She is advised to see an endocrinologist given her elevated serum glucose and HgA1c\par Maintain a low fat, low cholesterol diet.\par Weight reduction is advised.\par BMP lipid and hepatic panel HgA1c\par RV in 6 months.

## 2022-02-02 NOTE — REASON FOR VISIT
[FreeTextEntry1] : Patient presents for a follow up visit. She has an Rx. slip for blood testing and has not gone yet. She will send me a copy once she has it performed.

## 2022-04-27 ENCOUNTER — NON-APPOINTMENT (OUTPATIENT)
Age: 62
End: 2022-04-27

## 2022-05-18 ENCOUNTER — NON-APPOINTMENT (OUTPATIENT)
Age: 62
End: 2022-05-18

## 2022-05-18 ENCOUNTER — APPOINTMENT (OUTPATIENT)
Dept: OBGYN | Facility: CLINIC | Age: 62
End: 2022-05-18
Payer: MEDICARE

## 2022-05-18 VITALS
HEART RATE: 101 BPM | WEIGHT: 192 LBS | HEIGHT: 64.5 IN | DIASTOLIC BLOOD PRESSURE: 83 MMHG | TEMPERATURE: 98.6 F | BODY MASS INDEX: 32.38 KG/M2 | SYSTOLIC BLOOD PRESSURE: 112 MMHG

## 2022-05-18 DIAGNOSIS — R39.89 OTHER SYMPTOMS AND SIGNS INVOLVING THE GENITOURINARY SYSTEM: ICD-10-CM

## 2022-05-18 LAB
BILIRUB UR QL STRIP: NEGATIVE
CLARITY UR: CLEAR
COLLECTION METHOD: NORMAL
GLUCOSE UR-MCNC: NEGATIVE
HCG UR QL: 0.2 EU/DL
HGB UR QL STRIP.AUTO: NEGATIVE
KETONES UR-MCNC: NEGATIVE
LEUKOCYTE ESTERASE UR QL STRIP: NEGATIVE
NITRITE UR QL STRIP: NEGATIVE
PH UR STRIP: 5
PROT UR STRIP-MCNC: NEGATIVE
SP GR UR STRIP: 1.01

## 2022-05-18 PROCEDURE — 99213 OFFICE O/P EST LOW 20 MIN: CPT

## 2022-05-18 PROCEDURE — 81003 URINALYSIS AUTO W/O SCOPE: CPT | Mod: QW

## 2022-05-18 NOTE — PHYSICAL EXAM
[Vulvitis] : vulvitis [Labia Majora] : normal [Labia Minora] : normal [Normal] : normal [Uterine Adnexae] : normal

## 2022-05-21 LAB
A VAGINAE DNA VAG QL NAA+PROBE: NORMAL
BACTERIA UR CULT: NORMAL
BVAB2 DNA VAG QL NAA+PROBE: NORMAL
C KRUSEI DNA VAG QL NAA+PROBE: NEGATIVE
C TRACH RRNA SPEC QL NAA+PROBE: NEGATIVE
HPV HIGH+LOW RISK DNA PNL CVX: NOT DETECTED
MEGA1 DNA VAG QL NAA+PROBE: NORMAL
N GONORRHOEA RRNA SPEC QL NAA+PROBE: NEGATIVE
T VAGINALIS RRNA SPEC QL NAA+PROBE: NEGATIVE

## 2022-05-28 LAB — CYTOLOGY CVX/VAG DOC THIN PREP: NORMAL

## 2022-11-01 ENCOUNTER — NON-APPOINTMENT (OUTPATIENT)
Age: 62
End: 2022-11-01

## 2022-11-02 ENCOUNTER — APPOINTMENT (OUTPATIENT)
Dept: CARDIOLOGY | Facility: CLINIC | Age: 62
End: 2022-11-02

## 2022-11-02 VITALS
WEIGHT: 191 LBS | SYSTOLIC BLOOD PRESSURE: 118 MMHG | HEIGHT: 65 IN | BODY MASS INDEX: 31.82 KG/M2 | DIASTOLIC BLOOD PRESSURE: 78 MMHG | HEART RATE: 89 BPM

## 2022-11-02 DIAGNOSIS — I10 ESSENTIAL (PRIMARY) HYPERTENSION: ICD-10-CM

## 2022-11-02 PROCEDURE — 93000 ELECTROCARDIOGRAM COMPLETE: CPT

## 2022-11-02 PROCEDURE — 99214 OFFICE O/P EST MOD 30 MIN: CPT

## 2022-11-02 NOTE — PHYSICAL EXAM
[General Appearance - Well Developed] : well developed [Normal Appearance] : normal appearance [General Appearance - Well Nourished] : well nourished [Normal Conjunctiva] : the conjunctiva exhibited no abnormalities [Normal Oropharynx] : normal oropharynx [Normal Jugular Venous V Waves Present] : normal jugular venous V waves present [Respiration, Rhythm And Depth] : normal respiratory rhythm and effort [Auscultation Breath Sounds / Voice Sounds] : lungs were clear to auscultation bilaterally [Heart Rate And Rhythm] : heart rate and rhythm were normal [Heart Sounds] : normal S1 and S2 [Arterial Pulses Normal] : the arterial pulses were normal [Abdomen Soft] : soft [Edema] : no peripheral edema present [Abdomen Tenderness] : non-tender [Cyanosis, Localized] : no localized cyanosis [Skin Color & Pigmentation] : normal skin color and pigmentation

## 2022-11-02 NOTE — DISCUSSION/SUMMARY
[FreeTextEntry1] : Holter monitor was reviewed with the patient and was NSR. This was discussed on prior visit. \par 12 lead EKG performed today revealed Normal findings with NSR and a HR of 89 bpm.\par She is off oxygen therapy.\par The patient was advised to maintain her present medications.\par She was advised to lower her T. cholesterol level to less than 200 mg/dl and LDL to less than 100 mg/dl.\par Start an exercise program.\par She is advised to see an endocrinologist given her elevated serum glucose and HgA1c\par Maintain a low fat, low cholesterol diet.\par Weight reduction is advised.\par BMP lipid and hepatic panel.\par 2D echo doppler\par RV in 3 months. [EKG obtained to assist in diagnosis and management of assessed problem(s)] : EKG obtained to assist in diagnosis and management of assessed problem(s)

## 2022-11-16 ENCOUNTER — APPOINTMENT (OUTPATIENT)
Dept: OBGYN | Facility: CLINIC | Age: 62
End: 2022-11-16

## 2022-11-16 VITALS
DIASTOLIC BLOOD PRESSURE: 84 MMHG | HEART RATE: 98 BPM | BODY MASS INDEX: 31.65 KG/M2 | SYSTOLIC BLOOD PRESSURE: 127 MMHG | WEIGHT: 190 LBS | HEIGHT: 65 IN

## 2022-11-16 DIAGNOSIS — R10.2 PELVIC AND PERINEAL PAIN: ICD-10-CM

## 2022-11-16 PROCEDURE — 99213 OFFICE O/P EST LOW 20 MIN: CPT

## 2022-11-16 NOTE — HISTORY OF PRESENT ILLNESS
[Localized] : localized [FreeTextEntry1] : pelvic pressure worsening\par feels like something is falling out.  \par Interfering with her life quality [TextBox_7] : vagina...pressure

## 2022-11-16 NOTE — PHYSICAL EXAM
[Vulvitis] : vulvitis [Labia Majora] : normal [Labia Minora] : normal [Cystocele] : a cystocele [Rectocele] : a rectocele [Normal] : normal [Uterine Adnexae] : normal

## 2022-11-18 LAB — HPV HIGH+LOW RISK DNA PNL CVX: NOT DETECTED

## 2022-12-04 LAB — CYTOLOGY CVX/VAG DOC THIN PREP: NORMAL

## 2023-02-09 ENCOUNTER — APPOINTMENT (OUTPATIENT)
Dept: CARDIOLOGY | Facility: CLINIC | Age: 63
End: 2023-02-09

## 2023-02-28 ENCOUNTER — APPOINTMENT (OUTPATIENT)
Dept: ENDOCRINOLOGY | Facility: CLINIC | Age: 63
End: 2023-02-28

## 2023-03-13 ENCOUNTER — APPOINTMENT (OUTPATIENT)
Dept: CARDIOLOGY | Facility: CLINIC | Age: 63
End: 2023-03-13

## 2023-04-03 ENCOUNTER — APPOINTMENT (OUTPATIENT)
Dept: CARDIOLOGY | Facility: CLINIC | Age: 63
End: 2023-04-03
Payer: MEDICARE

## 2023-04-03 PROCEDURE — 93306 TTE W/DOPPLER COMPLETE: CPT

## 2023-06-07 ENCOUNTER — APPOINTMENT (OUTPATIENT)
Dept: OBGYN | Facility: CLINIC | Age: 63
End: 2023-06-07
Payer: MEDICARE

## 2023-06-07 VITALS
SYSTOLIC BLOOD PRESSURE: 130 MMHG | HEART RATE: 84 BPM | BODY MASS INDEX: 31.32 KG/M2 | WEIGHT: 188 LBS | DIASTOLIC BLOOD PRESSURE: 79 MMHG | HEIGHT: 65 IN

## 2023-06-07 DIAGNOSIS — R39.89 OTHER SYMPTOMS AND SIGNS INVOLVING THE GENITOURINARY SYSTEM: ICD-10-CM

## 2023-06-07 PROCEDURE — G0402 INITIAL PREVENTIVE EXAM: CPT

## 2023-06-07 NOTE — HISTORY OF PRESENT ILLNESS
[FreeTextEntry1] : here for annual\par has dropped bladder\par discomfort in certain sexual positions\par no incontinence\par urgency when full\par incomplete emptying

## 2023-06-07 NOTE — PHYSICAL EXAM
[Chaperone Present] : A chaperone was present in the examining room during all aspects of the physical examination [Examination Of The Breasts] : a normal appearance [No Masses] : no breast masses were palpable [Vulvitis] : vulvitis [Labia Majora] : normal [Labia Minora] : normal [Cystocele] : a cystocele [Rectocele] : a rectocele [Normal] : normal [Uterine Adnexae] : normal

## 2023-06-07 NOTE — REVIEW OF SYSTEMS
[Urgency] : urgency [Pelvic pain] : pelvic pain [Negative] : Breast [Genital Rash/Irritation] : no genital rash/irritation

## 2023-06-10 LAB
BACTERIA UR CULT: NORMAL
CYTOLOGY CVX/VAG DOC THIN PREP: NORMAL
HPV HIGH+LOW RISK DNA PNL CVX: NOT DETECTED

## 2023-07-07 ENCOUNTER — APPOINTMENT (OUTPATIENT)
Dept: UROGYNECOLOGY | Facility: CLINIC | Age: 63
End: 2023-07-07
Payer: MEDICARE

## 2023-07-07 VITALS
SYSTOLIC BLOOD PRESSURE: 109 MMHG | DIASTOLIC BLOOD PRESSURE: 75 MMHG | HEART RATE: 99 BPM | BODY MASS INDEX: 31.32 KG/M2 | WEIGHT: 188 LBS | HEIGHT: 65 IN

## 2023-07-07 DIAGNOSIS — F41.9 ANXIETY DISORDER, UNSPECIFIED: ICD-10-CM

## 2023-07-07 DIAGNOSIS — Z80.0 FAMILY HISTORY OF MALIGNANT NEOPLASM OF DIGESTIVE ORGANS: ICD-10-CM

## 2023-07-07 DIAGNOSIS — N81.10 CYSTOCELE, UNSPECIFIED: ICD-10-CM

## 2023-07-07 DIAGNOSIS — R35.1 NOCTURIA: ICD-10-CM

## 2023-07-07 DIAGNOSIS — N95.2 POSTMENOPAUSAL ATROPHIC VAGINITIS: ICD-10-CM

## 2023-07-07 DIAGNOSIS — N94.9 UNSPECIFIED CONDITION ASSOCIATED WITH FEMALE GENITAL ORGANS AND MENSTRUAL CYCLE: ICD-10-CM

## 2023-07-07 DIAGNOSIS — N94.89 OTHER SPECIFIED CONDITIONS ASSOCIATED WITH FEMALE GENITAL ORGANS AND MENSTRUAL CYCLE: ICD-10-CM

## 2023-07-07 DIAGNOSIS — Z01.419 ENCOUNTER FOR GYNECOLOGICAL EXAMINATION (GENERAL) (ROUTINE) W/OUT ABNORMAL FINDINGS: ICD-10-CM

## 2023-07-07 DIAGNOSIS — N94.10 UNSPECIFIED DYSPAREUNIA: ICD-10-CM

## 2023-07-07 DIAGNOSIS — Z87.898 PERSONAL HISTORY OF OTHER SPECIFIED CONDITIONS: ICD-10-CM

## 2023-07-07 DIAGNOSIS — Z86.19 PERSONAL HISTORY OF OTHER INFECTIOUS AND PARASITIC DISEASES: ICD-10-CM

## 2023-07-07 DIAGNOSIS — Z84.1 FAMILY HISTORY OF DISORDERS OF KIDNEY AND URETER: ICD-10-CM

## 2023-07-07 DIAGNOSIS — R39.14 FEELING OF INCOMPLETE BLADDER EMPTYING: ICD-10-CM

## 2023-07-07 DIAGNOSIS — F32.A ANXIETY DISORDER, UNSPECIFIED: ICD-10-CM

## 2023-07-07 PROCEDURE — 99205 OFFICE O/P NEW HI 60 MIN: CPT | Mod: 25

## 2023-07-07 PROCEDURE — 51701 INSERT BLADDER CATHETER: CPT

## 2023-07-07 PROCEDURE — 99215 OFFICE O/P EST HI 40 MIN: CPT | Mod: 25

## 2023-07-07 RX ORDER — FAMOTIDINE 20 MG/1
20 TABLET, FILM COATED ORAL DAILY
Refills: 0 | Status: COMPLETED | COMMUNITY
End: 2023-07-07

## 2023-07-07 RX ORDER — CLOTRIMAZOLE AND BETAMETHASONE DIPROPIONATE 10; .5 MG/G; MG/G
1-0.05 CREAM TOPICAL TWICE DAILY
Qty: 1 | Refills: 3 | Status: COMPLETED | COMMUNITY
Start: 2022-05-18 | End: 2023-07-07

## 2023-07-07 RX ORDER — FLUCONAZOLE 200 MG/1
200 TABLET ORAL
Qty: 3 | Refills: 3 | Status: COMPLETED | COMMUNITY
Start: 2022-05-18 | End: 2023-07-07

## 2023-07-07 RX ORDER — GLIPIZIDE 10 MG/1
10 TABLET, FILM COATED, EXTENDED RELEASE ORAL
Qty: 30 | Refills: 0 | Status: COMPLETED | COMMUNITY
Start: 2022-09-08 | End: 2023-07-07

## 2023-07-07 RX ORDER — TIMOLOL MALEATE 5 MG/ML
SOLUTION/ DROPS OPHTHALMIC DAILY
Refills: 0 | Status: COMPLETED | COMMUNITY
End: 2023-07-07

## 2023-07-07 RX ORDER — FLUCONAZOLE 150 MG/1
150 TABLET ORAL
Qty: 2 | Refills: 0 | Status: COMPLETED | COMMUNITY
Start: 2021-11-29 | End: 2023-07-07

## 2023-07-07 RX ORDER — METFORMIN HYDROCHLORIDE 850 MG/1
850 TABLET, COATED ORAL
Qty: 60 | Refills: 0 | Status: COMPLETED | COMMUNITY
Start: 2022-04-21 | End: 2023-07-07

## 2023-07-07 RX ORDER — FLUCONAZOLE 150 MG/1
150 TABLET ORAL
Qty: 3 | Refills: 0 | Status: COMPLETED | COMMUNITY
Start: 2022-04-27 | End: 2023-07-07

## 2023-07-07 NOTE — PHYSICAL EXAM
[Chaperone Present] : A chaperone was present in the examining room during all aspects of the physical examination [FreeTextEntry1] : Void: 100 cc\par \par PVR: 20 cc\par \par Urethra was prepped in sterile fashion and then a sterile 14F catheter was used by me to drain the bladder for her symptoms of prolapse. Patient tolerated the procedure well\par \par Well healed incision: RUQ, LUQ, RLQ, inguinal, abdominoplasty, vertical, laparoscopic, Pfannenstiel\par \par GH: 5 PB: 3  TVL: 8  C: -3  D:  -4  Aa:  + 1 Ba: +1  Ap: +1  Bp: +1 \par \par empty cough stress test: neg\par \par atrophy: +\par \par prolapse +\par \par urethral hypermobility\par \par + pelvic floor dysfunction (levator ani tenderness 1/5 b/l, obturator tenderness 1/5 b/l)\par \par urethral tenderness: neg\par \par bladder tenderness: neg\par \par cervix: normal nontender\par \par uterus: normal nontender\par \par adnexa nonpalpable b/l\par \par good sphincter tone\par \par no enterocele\par \par good rectal squeeze\par \par intact sacral nerves\par

## 2023-07-07 NOTE — ASSESSMENT
[FreeTextEntry1] : High tone pelvic floor dysfunction -\par I discussed the etiology of her condition with her and treatment options. She will start Flexeril 5mg nightly and will increase to twice a day dosing as tolerated for 2 weeks, then PRN. R/B/A of Flexeril use were discussed. \par \par Dyspareunia -\par Likely a result of the above. Discussed this with the patient. Will reevaluate after Flexeril use.\par \par Vaginal atrophy -\par Discussed etiology and treatment options with patient. Discussed R/B/A of estrogen vaginal cream. Patient will start using as follows:\par Place a pea size (0.5 grams or less) dab of Estrogen vaginal cream using finger (NOT the applicator) into the vagina 3 times a week ( Monday, Wednesday, Friday)\par \par Feeling of incomplete bladder emptying -\par Likely a result of GUSM. Will reevaluate once starts using vaginal estrogen cream.\par \par Uterovaginal prolapse -\par Stage II prolapse, which is largely asymptomatic. The patient was counseled regarding the possible natural progression of prolapse and the clinical consequences of worsening prolapse. The stage and the location of the prolapse was reviewed with the patient. She was counseled regarding the management strategies including observation, pelvic floor physical therapy, pessary placement and surgery. She would like to proceed with observation at this time.\par

## 2023-07-07 NOTE — COUNSELING
[FreeTextEntry1] : Please follow up with the physician assistant in about 6 weeks.\par \par Place a pea size (0.5 grams or less) dab of Estrogen vaginal cream using finger (NOT the applicator) into the vagina 3 times a week ( Monday, Wednesday, Friday)\par \par Start Flexeril 5mg nightly and increase to twice a day dosing as tolerated for 2 weeks, then as needed. Remember that it can make you feel sleepy and drowsy.\par \par

## 2023-07-07 NOTE — HISTORY OF PRESENT ILLNESS
[FreeTextEntry1] : 62 year para 3 (NSVDx3) presents with complaints of feeling pressure during intercourse for the last few years, and bladder prolapse according to her GYN.\par \par Pelvic organ prolapse: no bulge, no pressure/heaviness\par \par Stress urinary incontinence: 0 x/week 0 prior incontinence procedures\par \par Overactive bladder syndrome: daily frequency 5 x/day, 0-1 x/night,  no regular urgency,  0 x/week UUI episodes,    0 pads/day     Bladder irritants include coffee (2-3 cups/d),    Prior OAB meds none\par \par Voiding dysfunction: + Incomplete bladder emptying sometimes feels like she has to return in 30 minutes, no splinting, no hesitancy\par \par Lower urinary tract/vaginal symptoms: 0 UTIs this past year, no hematuria, no dysuria, no bladder pain\par \par 7 BM/week   no constipation   Fecal incontinence no \par \par Sexually active yes Dyspareunia with deep pressure only Pelvic pain no Vaginal dryness sometimes, using KY Lube and it helps LMP in 2004 w/o PMB\par \par PMSH:\par Diabetic, Hgb A1C 7.2% recently. Measures her FS at home, usually around 160-200 (fasting) managed by PCP\par s/p LSC cholecystectomy\par

## 2023-07-10 LAB
APPEARANCE: CLEAR
BILIRUBIN URINE: NEGATIVE
BLOOD URINE: NEGATIVE
COLOR: YELLOW
GLUCOSE QUALITATIVE U: 500 MG/DL
KETONES URINE: NEGATIVE MG/DL
LEUKOCYTE ESTERASE URINE: NEGATIVE
NITRITE URINE: NEGATIVE
PH URINE: 5.5
PROTEIN URINE: NEGATIVE MG/DL
SPECIFIC GRAVITY URINE: 1.02
URINE CULTURE <10: NORMAL
UROBILINOGEN URINE: 0.2 MG/DL

## 2023-08-17 ENCOUNTER — APPOINTMENT (OUTPATIENT)
Dept: UROGYNECOLOGY | Facility: CLINIC | Age: 63
End: 2023-08-17

## 2023-10-02 ENCOUNTER — NON-APPOINTMENT (OUTPATIENT)
Age: 63
End: 2023-10-02

## 2023-10-02 DIAGNOSIS — Z12.39 ENCOUNTER FOR OTHER SCREENING FOR MALIGNANT NEOPLASM OF BREAST: ICD-10-CM

## 2023-10-25 ENCOUNTER — LABORATORY RESULT (OUTPATIENT)
Age: 63
End: 2023-10-25

## 2023-10-26 ENCOUNTER — APPOINTMENT (OUTPATIENT)
Dept: OBGYN | Facility: CLINIC | Age: 63
End: 2023-10-26
Payer: MEDICARE

## 2023-10-26 VITALS
HEIGHT: 65 IN | DIASTOLIC BLOOD PRESSURE: 84 MMHG | BODY MASS INDEX: 32.15 KG/M2 | HEART RATE: 116 BPM | SYSTOLIC BLOOD PRESSURE: 132 MMHG | WEIGHT: 193 LBS

## 2023-10-26 DIAGNOSIS — R39.9 UNSPECIFIED SYMPTOMS AND SIGNS INVOLVING THE GENITOURINARY SYSTEM: ICD-10-CM

## 2023-10-26 DIAGNOSIS — Z87.891 PERSONAL HISTORY OF NICOTINE DEPENDENCE: ICD-10-CM

## 2023-10-26 DIAGNOSIS — N93.9 ABNORMAL UTERINE AND VAGINAL BLEEDING, UNSPECIFIED: ICD-10-CM

## 2023-10-26 LAB
BILIRUB UR QL STRIP: NEGATIVE
CLARITY UR: CLEAR
COLLECTION METHOD: NORMAL
GLUCOSE UR-MCNC: NEGATIVE
HCG UR QL: 0.2 EU/DL
HGB UR QL STRIP.AUTO: NORMAL
KETONES UR-MCNC: NEGATIVE
LEUKOCYTE ESTERASE UR QL STRIP: NORMAL
NITRITE UR QL STRIP: NEGATIVE
PH UR STRIP: 6
PROT UR STRIP-MCNC: NORMAL
SP GR UR STRIP: 1.01

## 2023-10-26 PROCEDURE — 81003 URINALYSIS AUTO W/O SCOPE: CPT | Mod: QW

## 2023-10-26 PROCEDURE — 76830 TRANSVAGINAL US NON-OB: CPT

## 2023-10-26 PROCEDURE — 99213 OFFICE O/P EST LOW 20 MIN: CPT | Mod: 25

## 2023-10-26 RX ORDER — SULFAMETHOXAZOLE AND TRIMETHOPRIM 800; 160 MG/1; MG/1
800-160 TABLET ORAL
Qty: 14 | Refills: 0 | Status: ACTIVE | COMMUNITY
Start: 2023-10-26 | End: 1900-01-01

## 2023-10-26 RX ORDER — CYCLOBENZAPRINE HYDROCHLORIDE 5 MG/1
5 TABLET, FILM COATED ORAL
Qty: 30 | Refills: 1 | Status: COMPLETED | COMMUNITY
Start: 2023-07-07 | End: 2023-10-26

## 2023-10-26 RX ORDER — LANCETS 33 GAUGE
EACH MISCELLANEOUS
Qty: 100 | Refills: 0 | Status: COMPLETED | COMMUNITY
Start: 2022-10-03 | End: 2023-10-26

## 2023-10-26 RX ORDER — FAMOTIDINE 40 MG/1
40 TABLET, FILM COATED ORAL
Qty: 30 | Refills: 0 | Status: COMPLETED | COMMUNITY
Start: 2022-08-24 | End: 2023-10-26

## 2023-10-26 RX ORDER — BLOOD SUGAR DIAGNOSTIC
STRIP MISCELLANEOUS
Qty: 75 | Refills: 0 | Status: ACTIVE | COMMUNITY
Start: 2022-09-06

## 2023-10-31 LAB
APPEARANCE: CLEAR
BACTERIA UR CULT: ABNORMAL
BILIRUBIN URINE: NEGATIVE
BLOOD URINE: ABNORMAL
COLOR: YELLOW
GLUCOSE QUALITATIVE U: NEGATIVE MG/DL
KETONES URINE: NEGATIVE MG/DL
LEUKOCYTE ESTERASE URINE: ABNORMAL
NITRITE URINE: NEGATIVE
PH URINE: 6.5
PROTEIN URINE: NORMAL MG/DL
SPECIFIC GRAVITY URINE: 1
UROBILINOGEN URINE: 0.2 MG/DL

## 2023-11-24 ENCOUNTER — NON-APPOINTMENT (OUTPATIENT)
Age: 63
End: 2023-11-24

## 2023-11-27 ENCOUNTER — APPOINTMENT (OUTPATIENT)
Dept: OBGYN | Facility: CLINIC | Age: 63
End: 2023-11-27
Payer: MEDICARE

## 2023-11-27 VITALS
HEIGHT: 65 IN | WEIGHT: 194 LBS | SYSTOLIC BLOOD PRESSURE: 125 MMHG | DIASTOLIC BLOOD PRESSURE: 88 MMHG | HEART RATE: 90 BPM | BODY MASS INDEX: 32.32 KG/M2

## 2023-11-27 DIAGNOSIS — N88.2 STRICTURE AND STENOSIS OF CERVIX UTERI: ICD-10-CM

## 2023-11-27 DIAGNOSIS — N85.9 NONINFLAMMATORY DISORDER OF UTERUS, UNSPECIFIED: ICD-10-CM

## 2023-11-27 LAB
BILIRUB UR QL STRIP: NEGATIVE
CLARITY UR: CLEAR
COLLECTION METHOD: NORMAL
GLUCOSE UR-MCNC: NORMAL
HCG UR QL: 0.2 EU/DL
HGB UR QL STRIP.AUTO: NEGATIVE
KETONES UR-MCNC: NEGATIVE
LEUKOCYTE ESTERASE UR QL STRIP: NEGATIVE
NITRITE UR QL STRIP: NEGATIVE
PH UR STRIP: 5
PROT UR STRIP-MCNC: NEGATIVE
SP GR UR STRIP: 1.02

## 2023-11-27 PROCEDURE — 99213 OFFICE O/P EST LOW 20 MIN: CPT | Mod: 25

## 2023-11-27 PROCEDURE — 76830 TRANSVAGINAL US NON-OB: CPT

## 2023-11-27 PROCEDURE — 81003 URINALYSIS AUTO W/O SCOPE: CPT | Mod: QW

## 2023-11-27 RX ORDER — LATANOPROST/PF 0.005 %
0.01 DROPS OPHTHALMIC (EYE)
Qty: 2 | Refills: 0 | Status: COMPLETED | COMMUNITY
Start: 2022-08-02 | End: 2023-11-27

## 2023-11-28 ENCOUNTER — APPOINTMENT (OUTPATIENT)
Dept: OBGYN | Facility: CLINIC | Age: 63
End: 2023-11-28

## 2023-12-05 LAB — BACTERIA UR CULT: NORMAL

## 2024-06-06 ENCOUNTER — APPOINTMENT (OUTPATIENT)
Dept: OBGYN | Facility: CLINIC | Age: 64
End: 2024-06-06
Payer: MEDICARE

## 2024-06-06 VITALS
HEART RATE: 94 BPM | DIASTOLIC BLOOD PRESSURE: 89 MMHG | HEIGHT: 65 IN | WEIGHT: 188 LBS | SYSTOLIC BLOOD PRESSURE: 132 MMHG | BODY MASS INDEX: 31.32 KG/M2

## 2024-06-06 DIAGNOSIS — N81.11 CYSTOCELE, MIDLINE: ICD-10-CM

## 2024-06-06 DIAGNOSIS — Z01.419 ENCOUNTER FOR GYNECOLOGICAL EXAMINATION (GENERAL) (ROUTINE) W/OUT ABNORMAL FINDINGS: ICD-10-CM

## 2024-06-06 DIAGNOSIS — N81.2 INCOMPLETE UTEROVAGINAL PROLAPSE: ICD-10-CM

## 2024-06-06 DIAGNOSIS — N85.9 NONINFLAMMATORY DISORDER OF UTERUS, UNSPECIFIED: ICD-10-CM

## 2024-06-06 DIAGNOSIS — N81.6 RECTOCELE: ICD-10-CM

## 2024-06-06 PROCEDURE — 99459 PELVIC EXAMINATION: CPT

## 2024-06-06 PROCEDURE — 99213 OFFICE O/P EST LOW 20 MIN: CPT

## 2024-06-06 RX ORDER — FAMOTIDINE 10 MG/1
TABLET, FILM COATED ORAL
Refills: 0 | Status: ACTIVE | COMMUNITY

## 2024-06-06 RX ORDER — ATORVASTATIN CALCIUM 80 MG/1
80 TABLET, FILM COATED ORAL DAILY
Refills: 0 | Status: ACTIVE | COMMUNITY

## 2024-06-06 RX ORDER — SERTRALINE HYDROCHLORIDE 50 MG/1
50 TABLET, FILM COATED ORAL DAILY
Refills: 0 | Status: ACTIVE | COMMUNITY

## 2024-06-06 RX ORDER — LOSARTAN POTASSIUM 50 MG/1
50 TABLET, FILM COATED ORAL DAILY
Qty: 90 | Refills: 3 | Status: ACTIVE | COMMUNITY
Start: 2021-08-15

## 2024-06-06 RX ORDER — SITAGLIPTIN 100 MG/1
100 TABLET, FILM COATED ORAL DAILY
Refills: 0 | Status: ACTIVE | COMMUNITY

## 2024-06-06 RX ORDER — GLIPIZIDE 10 MG/1
10 TABLET ORAL TWICE DAILY
Refills: 0 | Status: ACTIVE | COMMUNITY

## 2024-06-06 RX ORDER — METFORMIN HYDROCHLORIDE 850 MG/1
850 TABLET, COATED ORAL TWICE DAILY
Refills: 0 | Status: ACTIVE | COMMUNITY

## 2024-06-06 RX ORDER — SEMAGLUTIDE 1.34 MG/ML
2 INJECTION, SOLUTION SUBCUTANEOUS
Refills: 0 | Status: ACTIVE | COMMUNITY

## 2024-06-06 NOTE — PHYSICAL EXAM
[Chaperone Present] : A chaperone was present in the examining room during all aspects of the physical examination [24652] : A chaperone was present during the pelvic exam. [Examination Of The Breasts] : a normal appearance [No Masses] : no breast masses were palpable [Vulvitis] : vulvitis [Labia Majora] : normal [Labia Minora] : normal [Cystocele] : a cystocele [Rectocele] : a rectocele [Cervical Stenosis] : cervical stenosis [Normal] : normal [Uterine Adnexae] : normal

## 2024-06-06 NOTE — REVIEW OF SYSTEMS
[Negative] : Breast [Urgency] : no urgency [Pelvic pain] : no pelvic pain [Genital Rash/Irritation] : no genital rash/irritation

## 2024-06-06 NOTE — REASON FOR VISIT
[Follow-Up] : a follow-up evaluation of [FreeTextEntry2] : CERVICAL STENOSIS/ABNORMAL PAP/CYSTORECTOCELE

## 2024-06-08 LAB — BACTERIA UR CULT: NORMAL

## 2024-06-16 LAB
CYTOLOGY CVX/VAG DOC THIN PREP: NORMAL
HPV HIGH+LOW RISK DNA PNL CVX: NOT DETECTED

## 2024-07-02 ENCOUNTER — APPOINTMENT (OUTPATIENT)
Dept: ENDOCRINOLOGY | Facility: CLINIC | Age: 64
End: 2024-07-02
Payer: MEDICARE

## 2024-07-02 VITALS
HEIGHT: 65 IN | BODY MASS INDEX: 31.16 KG/M2 | WEIGHT: 187 LBS | SYSTOLIC BLOOD PRESSURE: 130 MMHG | HEART RATE: 81 BPM | DIASTOLIC BLOOD PRESSURE: 80 MMHG | OXYGEN SATURATION: 98 %

## 2024-07-02 DIAGNOSIS — E78.5 HYPERLIPIDEMIA, UNSPECIFIED: ICD-10-CM

## 2024-07-02 DIAGNOSIS — E11.9 TYPE 2 DIABETES MELLITUS W/OUT COMPLICATIONS: ICD-10-CM

## 2024-07-02 DIAGNOSIS — I10 ESSENTIAL (PRIMARY) HYPERTENSION: ICD-10-CM

## 2024-07-02 PROCEDURE — 99204 OFFICE O/P NEW MOD 45 MIN: CPT

## 2024-07-02 RX ORDER — SEMAGLUTIDE 0.68 MG/ML
2 INJECTION, SOLUTION SUBCUTANEOUS
Qty: 1 | Refills: 5 | Status: ACTIVE | COMMUNITY
Start: 2024-07-02 | End: 1900-01-01

## 2024-07-02 RX ORDER — BLOOD SUGAR DIAGNOSTIC
STRIP MISCELLANEOUS
Qty: 50 | Refills: 5 | Status: ACTIVE | COMMUNITY
Start: 2024-07-02 | End: 1900-01-01

## 2024-07-15 ENCOUNTER — NON-APPOINTMENT (OUTPATIENT)
Age: 64
End: 2024-07-15

## 2024-07-16 RX ORDER — FLUCONAZOLE 200 MG/1
200 TABLET ORAL
Qty: 3 | Refills: 0 | Status: ACTIVE | COMMUNITY
Start: 2024-07-15 | End: 1900-01-01

## 2024-11-06 ENCOUNTER — APPOINTMENT (OUTPATIENT)
Dept: ENDOCRINOLOGY | Facility: CLINIC | Age: 64
End: 2024-11-06
Payer: MEDICARE

## 2024-11-06 VITALS
WEIGHT: 180 LBS | OXYGEN SATURATION: 98 % | HEIGHT: 65 IN | SYSTOLIC BLOOD PRESSURE: 112 MMHG | DIASTOLIC BLOOD PRESSURE: 70 MMHG | HEART RATE: 91 BPM | BODY MASS INDEX: 29.99 KG/M2

## 2024-11-06 DIAGNOSIS — E11.9 TYPE 2 DIABETES MELLITUS W/OUT COMPLICATIONS: ICD-10-CM

## 2024-11-06 PROCEDURE — 99213 OFFICE O/P EST LOW 20 MIN: CPT

## 2024-11-06 RX ORDER — SEMAGLUTIDE 1.34 MG/ML
4 INJECTION, SOLUTION SUBCUTANEOUS
Qty: 1 | Refills: 5 | Status: ACTIVE | COMMUNITY
Start: 2024-11-06 | End: 1900-01-01

## 2024-11-14 ENCOUNTER — NON-APPOINTMENT (OUTPATIENT)
Age: 64
End: 2024-11-14

## 2024-11-14 ENCOUNTER — APPOINTMENT (OUTPATIENT)
Dept: CARDIOLOGY | Facility: CLINIC | Age: 64
End: 2024-11-14
Payer: MEDICARE

## 2024-11-14 VITALS
HEIGHT: 65 IN | SYSTOLIC BLOOD PRESSURE: 122 MMHG | HEART RATE: 94 BPM | BODY MASS INDEX: 30.82 KG/M2 | WEIGHT: 185 LBS | DIASTOLIC BLOOD PRESSURE: 80 MMHG

## 2024-11-14 DIAGNOSIS — E78.5 HYPERLIPIDEMIA, UNSPECIFIED: ICD-10-CM

## 2024-11-14 DIAGNOSIS — I10 ESSENTIAL (PRIMARY) HYPERTENSION: ICD-10-CM

## 2024-11-14 PROCEDURE — 93000 ELECTROCARDIOGRAM COMPLETE: CPT

## 2024-11-14 PROCEDURE — 99214 OFFICE O/P EST MOD 30 MIN: CPT

## 2024-12-09 ENCOUNTER — APPOINTMENT (OUTPATIENT)
Dept: CARDIOLOGY | Facility: CLINIC | Age: 64
End: 2024-12-09
Payer: MEDICARE

## 2024-12-09 DIAGNOSIS — N64.89 OTHER SPECIFIED DISORDERS OF BREAST: ICD-10-CM

## 2024-12-09 PROCEDURE — 93306 TTE W/DOPPLER COMPLETE: CPT

## 2024-12-09 NOTE — ED PROVIDER NOTE - EKG #1 DATE/TIME
07-Jan-2021 11:59
PAST MEDICAL HISTORY:  Arthritis     GERD (gastroesophageal reflux disease)     HTN (hypertension)     Hypothyroidism

## 2025-01-04 ENCOUNTER — APPOINTMENT (OUTPATIENT)
Dept: OBGYN | Facility: CLINIC | Age: 65
End: 2025-01-04
Payer: MEDICARE

## 2025-01-04 VITALS
SYSTOLIC BLOOD PRESSURE: 136 MMHG | HEIGHT: 65 IN | WEIGHT: 184 LBS | HEART RATE: 92 BPM | BODY MASS INDEX: 30.66 KG/M2 | DIASTOLIC BLOOD PRESSURE: 80 MMHG

## 2025-01-04 DIAGNOSIS — N81.6 RECTOCELE: ICD-10-CM

## 2025-01-04 DIAGNOSIS — N87.9 DYSPLASIA OF CERVIX UTERI, UNSPECIFIED: ICD-10-CM

## 2025-01-04 DIAGNOSIS — N81.2 INCOMPLETE UTEROVAGINAL PROLAPSE: ICD-10-CM

## 2025-01-04 LAB
BILIRUB UR QL STRIP: NEGATIVE
CLARITY UR: CLEAR
COLLECTION METHOD: NORMAL
GLUCOSE UR-MCNC: NEGATIVE
HCG UR QL: 0.2 EU/DL
HGB UR QL STRIP.AUTO: NEGATIVE
KETONES UR-MCNC: NEGATIVE
LEUKOCYTE ESTERASE UR QL STRIP: ABNORMAL
NITRITE UR QL STRIP: NEGATIVE
PH UR STRIP: 5.5
PROT UR STRIP-MCNC: NEGATIVE
SP GR UR STRIP: >=1.03

## 2025-01-04 PROCEDURE — 99213 OFFICE O/P EST LOW 20 MIN: CPT

## 2025-01-04 PROCEDURE — 81003 URINALYSIS AUTO W/O SCOPE: CPT | Mod: QW

## 2025-01-04 PROCEDURE — 99459 PELVIC EXAMINATION: CPT

## 2025-01-11 DIAGNOSIS — N39.0 URINARY TRACT INFECTION, SITE NOT SPECIFIED: ICD-10-CM

## 2025-01-11 LAB
BACTERIA UR CULT: ABNORMAL
CYTOLOGY CVX/VAG DOC THIN PREP: NORMAL
HPV HIGH+LOW RISK DNA PNL CVX: NOT DETECTED

## 2025-01-11 RX ORDER — AMOXICILLIN AND CLAVULANATE POTASSIUM 875; 125 MG/1; MG/1
875-125 TABLET, COATED ORAL
Qty: 14 | Refills: 1 | Status: ACTIVE | COMMUNITY
Start: 2025-01-11 | End: 1900-01-01

## 2025-01-13 ENCOUNTER — NON-APPOINTMENT (OUTPATIENT)
Age: 65
End: 2025-01-13

## 2025-01-13 RX ORDER — FLUCONAZOLE 200 MG/1
200 TABLET ORAL
Qty: 3 | Refills: 1 | Status: ACTIVE | COMMUNITY
Start: 2025-01-13 | End: 1900-01-01

## 2025-03-10 ENCOUNTER — NON-APPOINTMENT (OUTPATIENT)
Age: 65
End: 2025-03-10

## 2025-03-10 ENCOUNTER — APPOINTMENT (OUTPATIENT)
Dept: CARDIOLOGY | Facility: CLINIC | Age: 65
End: 2025-03-10
Payer: MEDICARE

## 2025-03-10 VITALS
HEIGHT: 65 IN | HEART RATE: 86 BPM | BODY MASS INDEX: 30.99 KG/M2 | WEIGHT: 186 LBS | DIASTOLIC BLOOD PRESSURE: 70 MMHG | SYSTOLIC BLOOD PRESSURE: 110 MMHG

## 2025-03-10 DIAGNOSIS — I10 ESSENTIAL (PRIMARY) HYPERTENSION: ICD-10-CM

## 2025-03-10 DIAGNOSIS — E11.9 TYPE 2 DIABETES MELLITUS W/OUT COMPLICATIONS: ICD-10-CM

## 2025-03-10 DIAGNOSIS — E78.5 HYPERLIPIDEMIA, UNSPECIFIED: ICD-10-CM

## 2025-03-10 PROCEDURE — 93000 ELECTROCARDIOGRAM COMPLETE: CPT

## 2025-03-10 PROCEDURE — 99214 OFFICE O/P EST MOD 30 MIN: CPT

## 2025-03-18 ENCOUNTER — APPOINTMENT (OUTPATIENT)
Dept: OBGYN | Facility: CLINIC | Age: 65
End: 2025-03-18
Payer: MEDICARE

## 2025-03-18 ENCOUNTER — LABORATORY RESULT (OUTPATIENT)
Age: 65
End: 2025-03-18

## 2025-03-18 ENCOUNTER — NON-APPOINTMENT (OUTPATIENT)
Age: 65
End: 2025-03-18

## 2025-03-18 VITALS
BODY MASS INDEX: 30.66 KG/M2 | DIASTOLIC BLOOD PRESSURE: 83 MMHG | HEIGHT: 65 IN | HEART RATE: 89 BPM | SYSTOLIC BLOOD PRESSURE: 143 MMHG | WEIGHT: 184 LBS

## 2025-03-18 DIAGNOSIS — N76.0 ACUTE VAGINITIS: ICD-10-CM

## 2025-03-18 DIAGNOSIS — R39.9 UNSPECIFIED SYMPTOMS AND SIGNS INVOLVING THE GENITOURINARY SYSTEM: ICD-10-CM

## 2025-03-18 LAB
BILIRUB UR QL STRIP: NORMAL
CLARITY UR: CLEAR
COLLECTION METHOD: NORMAL
GLUCOSE UR-MCNC: NORMAL
HCG UR QL: 0.2 EU/DL
HGB UR QL STRIP.AUTO: NORMAL
KETONES UR-MCNC: NORMAL
LEUKOCYTE ESTERASE UR QL STRIP: ABNORMAL
NITRITE UR QL STRIP: NORMAL
PH UR STRIP: 5.5
PROT UR STRIP-MCNC: NORMAL
SP GR UR STRIP: 1.01

## 2025-03-18 PROCEDURE — 99213 OFFICE O/P EST LOW 20 MIN: CPT

## 2025-03-18 PROCEDURE — 81003 URINALYSIS AUTO W/O SCOPE: CPT | Mod: QW

## 2025-03-18 PROCEDURE — 99459 PELVIC EXAMINATION: CPT

## 2025-03-18 RX ORDER — FLUCONAZOLE 200 MG/1
200 TABLET ORAL
Qty: 3 | Refills: 1 | Status: ACTIVE | COMMUNITY
Start: 2025-03-18 | End: 1900-01-01

## 2025-03-18 RX ORDER — CLOTRIMAZOLE AND BETAMETHASONE DIPROPIONATE 10; .5 MG/G; MG/G
1-0.05 CREAM TOPICAL
Qty: 1 | Refills: 0 | Status: ACTIVE | COMMUNITY
Start: 2025-03-18 | End: 1900-01-01

## 2025-03-21 DIAGNOSIS — N39.0 URINARY TRACT INFECTION, SITE NOT SPECIFIED: ICD-10-CM

## 2025-03-21 LAB
APPEARANCE: CLEAR
BACTERIA UR CULT: ABNORMAL
BILIRUBIN URINE: NEGATIVE
BLOOD URINE: NEGATIVE
COLOR: YELLOW
GLUCOSE QUALITATIVE U: NEGATIVE MG/DL
KETONES URINE: NEGATIVE MG/DL
LEUKOCYTE ESTERASE URINE: ABNORMAL
NITRITE URINE: NEGATIVE
PH URINE: 6
PROTEIN URINE: NEGATIVE MG/DL
SPECIFIC GRAVITY URINE: 1.01
UROBILINOGEN URINE: 0.2 MG/DL

## 2025-03-21 RX ORDER — AMOXICILLIN AND CLAVULANATE POTASSIUM 875; 125 MG/1; MG/1
875-125 TABLET, COATED ORAL
Qty: 14 | Refills: 0 | Status: ACTIVE | COMMUNITY
Start: 2025-03-21 | End: 1900-01-01

## 2025-03-24 LAB
A VAGINAE DNA VAG QL NAA+PROBE: NORMAL
BVAB2 DNA VAG QL NAA+PROBE: NORMAL
C KRUSEI DNA VAG QL NAA+PROBE: NEGATIVE
C KRUSEI DNA VAG QL NAA+PROBE: NEGATIVE
C KRUSEI DNA VAG QL NAA+PROBE: NORMAL
C KRUSEI DNA VAG QL NAA+PROBE: NORMAL
C TRACH RRNA SPEC QL NAA+PROBE: NEGATIVE
CANDIDA DNA VAG QL NAA+PROBE: NORMAL
MEGA1 DNA VAG QL NAA+PROBE: NORMAL
N GONORRHOEA RRNA SPEC QL NAA+PROBE: NEGATIVE
T VAGINALIS RRNA SPEC QL NAA+PROBE: NEGATIVE

## 2025-05-06 ENCOUNTER — APPOINTMENT (OUTPATIENT)
Dept: ENDOCRINOLOGY | Facility: CLINIC | Age: 65
End: 2025-05-06
Payer: MEDICARE

## 2025-05-06 VITALS
HEART RATE: 87 BPM | SYSTOLIC BLOOD PRESSURE: 138 MMHG | BODY MASS INDEX: 30.82 KG/M2 | HEIGHT: 65 IN | DIASTOLIC BLOOD PRESSURE: 86 MMHG | WEIGHT: 185 LBS | OXYGEN SATURATION: 97 %

## 2025-05-06 DIAGNOSIS — E11.9 TYPE 2 DIABETES MELLITUS W/OUT COMPLICATIONS: ICD-10-CM

## 2025-05-06 DIAGNOSIS — E78.5 HYPERLIPIDEMIA, UNSPECIFIED: ICD-10-CM

## 2025-05-06 PROCEDURE — 99213 OFFICE O/P EST LOW 20 MIN: CPT

## 2025-05-06 RX ORDER — LANCETS 33 GAUGE
EACH MISCELLANEOUS
Qty: 1 | Refills: 11 | Status: ACTIVE | COMMUNITY
Start: 2025-05-06 | End: 1900-01-01

## 2025-05-06 RX ORDER — TIRZEPATIDE 5 MG/.5ML
5 INJECTION, SOLUTION SUBCUTANEOUS
Qty: 1 | Refills: 6 | Status: ACTIVE | COMMUNITY
Start: 2025-05-06 | End: 1900-01-01

## 2025-05-08 ENCOUNTER — NON-APPOINTMENT (OUTPATIENT)
Age: 65
End: 2025-05-08

## 2025-07-11 ENCOUNTER — APPOINTMENT (OUTPATIENT)
Dept: OBGYN | Facility: CLINIC | Age: 65
End: 2025-07-11
Payer: MEDICARE

## 2025-07-11 VITALS
DIASTOLIC BLOOD PRESSURE: 68 MMHG | BODY MASS INDEX: 29.99 KG/M2 | HEIGHT: 65 IN | HEART RATE: 89 BPM | SYSTOLIC BLOOD PRESSURE: 107 MMHG | WEIGHT: 180 LBS

## 2025-07-11 PROBLEM — Z98.890 H/O CONE BIOPSY OF CERVIX: Status: ACTIVE | Noted: 2025-07-11

## 2025-07-11 PROCEDURE — 99459 PELVIC EXAMINATION: CPT

## 2025-07-11 PROCEDURE — 99213 OFFICE O/P EST LOW 20 MIN: CPT

## 2025-07-11 RX ORDER — TIRZEPATIDE 7.5 MG/.5ML
INJECTION, SOLUTION SUBCUTANEOUS
Refills: 0 | Status: ACTIVE | COMMUNITY

## 2025-07-19 LAB
BACTERIA UR CULT: NORMAL
HPV HIGH+LOW RISK DNA PNL CVX: NOT DETECTED

## 2025-07-25 ENCOUNTER — NON-APPOINTMENT (OUTPATIENT)
Age: 65
End: 2025-07-25

## 2025-08-06 ENCOUNTER — NON-APPOINTMENT (OUTPATIENT)
Age: 65
End: 2025-08-06

## 2025-08-14 ENCOUNTER — NON-APPOINTMENT (OUTPATIENT)
Age: 65
End: 2025-08-14